# Patient Record
Sex: MALE | Race: WHITE | Employment: OTHER | URBAN - METROPOLITAN AREA
[De-identification: names, ages, dates, MRNs, and addresses within clinical notes are randomized per-mention and may not be internally consistent; named-entity substitution may affect disease eponyms.]

---

## 2019-04-08 ENCOUNTER — APPOINTMENT (OUTPATIENT)
Dept: CT IMAGING | Age: 69
End: 2019-04-08
Payer: OTHER GOVERNMENT

## 2019-04-08 ENCOUNTER — HOSPITAL ENCOUNTER (EMERGENCY)
Age: 69
Discharge: ANOTHER HOSPITAL | End: 2019-04-09
Attending: EMERGENCY MEDICINE
Payer: OTHER GOVERNMENT

## 2019-04-08 ENCOUNTER — APPOINTMENT (OUTPATIENT)
Dept: GENERAL RADIOLOGY | Age: 69
End: 2019-04-08
Payer: OTHER GOVERNMENT

## 2019-04-08 DIAGNOSIS — N18.9 CHRONIC KIDNEY DISEASE, UNSPECIFIED CKD STAGE: ICD-10-CM

## 2019-04-08 DIAGNOSIS — S02.2XXA CLOSED FRACTURE OF NASAL BONE, INITIAL ENCOUNTER: ICD-10-CM

## 2019-04-08 DIAGNOSIS — S06.5XAA SDH (SUBDURAL HEMATOMA): Primary | ICD-10-CM
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
## 2019-04-08 DIAGNOSIS — S01.81XA FACIAL LACERATION, INITIAL ENCOUNTER: ICD-10-CM

## 2019-04-08 LAB
ANION GAP SERPL CALCULATED.3IONS-SCNC: 14 MMOL/L (ref 3–16)
APTT: 26 SEC (ref 26–36)
BASOPHILS ABSOLUTE: 0 K/UL (ref 0–0.2)
BASOPHILS RELATIVE PERCENT: 0.3 %
BILIRUBIN URINE: NEGATIVE
BLOOD, URINE: ABNORMAL
BUN BLDV-MCNC: 43 MG/DL (ref 7–20)
CALCIUM SERPL-MCNC: 8.5 MG/DL (ref 8.3–10.6)
CHLORIDE BLD-SCNC: 99 MMOL/L (ref 99–110)
CLARITY: CLEAR
CO2: 22 MMOL/L (ref 21–32)
COLOR: YELLOW
CREAT SERPL-MCNC: 1.8 MG/DL (ref 0.8–1.3)
EOSINOPHILS ABSOLUTE: 0.1 K/UL (ref 0–0.6)
EOSINOPHILS RELATIVE PERCENT: 1.2 %
EPITHELIAL CELLS, UA: 0 /HPF (ref 0–5)
ETHANOL: NORMAL MG/DL (ref 0–0.08)
GFR AFRICAN AMERICAN: 45
GFR NON-AFRICAN AMERICAN: 38
GLUCOSE BLD-MCNC: 208 MG/DL (ref 70–99)
GLUCOSE URINE: NEGATIVE MG/DL
HCT VFR BLD CALC: 30.8 % (ref 40.5–52.5)
HEMOGLOBIN: 10.5 G/DL (ref 13.5–17.5)
HYALINE CASTS: 0 /LPF (ref 0–8)
INR BLD: 1.04 (ref 0.86–1.14)
KETONES, URINE: NEGATIVE MG/DL
LEUKOCYTE ESTERASE, URINE: NEGATIVE
LYMPHOCYTES ABSOLUTE: 1.1 K/UL (ref 1–5.1)
LYMPHOCYTES RELATIVE PERCENT: 16.7 %
MCH RBC QN AUTO: 30.7 PG (ref 26–34)
MCHC RBC AUTO-ENTMCNC: 34.1 G/DL (ref 31–36)
MCV RBC AUTO: 89.8 FL (ref 80–100)
MICROSCOPIC EXAMINATION: YES
MONOCYTES ABSOLUTE: 0.5 K/UL (ref 0–1.3)
MONOCYTES RELATIVE PERCENT: 7.9 %
NEUTROPHILS ABSOLUTE: 4.7 K/UL (ref 1.7–7.7)
NEUTROPHILS RELATIVE PERCENT: 73.9 %
NITRITE, URINE: NEGATIVE
PDW BLD-RTO: 14 % (ref 12.4–15.4)
PH UA: 5.5 (ref 5–8)
PLATELET # BLD: 161 K/UL (ref 135–450)
PMV BLD AUTO: 7 FL (ref 5–10.5)
POTASSIUM SERPL-SCNC: 4 MMOL/L (ref 3.5–5.1)
PROTEIN UA: NEGATIVE MG/DL
PROTHROMBIN TIME: 11.8 SEC (ref 9.8–13)
RBC # BLD: 3.42 M/UL (ref 4.2–5.9)
RBC UA: 1 /HPF (ref 0–4)
SODIUM BLD-SCNC: 135 MMOL/L (ref 136–145)
SPECIFIC GRAVITY UA: 1.01 (ref 1–1.03)
TROPONIN: <0.01 NG/ML
URINE REFLEX TO CULTURE: ABNORMAL
URINE TYPE: ABNORMAL
UROBILINOGEN, URINE: 0.2 E.U./DL
WBC # BLD: 6.4 K/UL (ref 4–11)
WBC UA: 1 /HPF (ref 0–5)

## 2019-04-08 PROCEDURE — 85730 THROMBOPLASTIN TIME PARTIAL: CPT

## 2019-04-08 PROCEDURE — 81001 URINALYSIS AUTO W/SCOPE: CPT

## 2019-04-08 PROCEDURE — 72125 CT NECK SPINE W/O DYE: CPT

## 2019-04-08 PROCEDURE — 85025 COMPLETE CBC W/AUTO DIFF WBC: CPT

## 2019-04-08 PROCEDURE — G0480 DRUG TEST DEF 1-7 CLASSES: HCPCS

## 2019-04-08 PROCEDURE — 80048 BASIC METABOLIC PNL TOTAL CA: CPT

## 2019-04-08 PROCEDURE — 70486 CT MAXILLOFACIAL W/O DYE: CPT

## 2019-04-08 PROCEDURE — 84484 ASSAY OF TROPONIN QUANT: CPT

## 2019-04-08 PROCEDURE — 99285 EMERGENCY DEPT VISIT HI MDM: CPT

## 2019-04-08 PROCEDURE — 70450 CT HEAD/BRAIN W/O DYE: CPT

## 2019-04-08 PROCEDURE — 71045 X-RAY EXAM CHEST 1 VIEW: CPT

## 2019-04-08 PROCEDURE — 85610 PROTHROMBIN TIME: CPT

## 2019-04-08 PROCEDURE — 93005 ELECTROCARDIOGRAM TRACING: CPT | Performed by: EMERGENCY MEDICINE

## 2019-04-08 RX ORDER — CLOPIDOGREL BISULFATE 75 MG/1
75 TABLET ORAL DAILY
Status: ON HOLD | COMMUNITY
End: 2019-04-12 | Stop reason: HOSPADM

## 2019-04-08 RX ORDER — ARIPIPRAZOLE 20 MG/1
20 TABLET ORAL DAILY
Status: ON HOLD | COMMUNITY
End: 2019-04-12 | Stop reason: SDUPTHER

## 2019-04-08 SDOH — HEALTH STABILITY: MENTAL HEALTH: HOW OFTEN DO YOU HAVE A DRINK CONTAINING ALCOHOL?: NEVER

## 2019-04-08 ASSESSMENT — ENCOUNTER SYMPTOMS
NAUSEA: 0
BACK PAIN: 0
BLOOD IN STOOL: 0
COUGH: 0
ABDOMINAL PAIN: 0
VOMITING: 0
DIARRHEA: 0

## 2019-04-08 ASSESSMENT — PAIN SCALES - GENERAL: PAINLEVEL_OUTOF10: 10

## 2019-04-08 ASSESSMENT — PAIN DESCRIPTION - PAIN TYPE: TYPE: ACUTE PAIN

## 2019-04-08 ASSESSMENT — PAIN DESCRIPTION - DESCRIPTORS: DESCRIPTORS: DISCOMFORT

## 2019-04-08 ASSESSMENT — PAIN DESCRIPTION - LOCATION: LOCATION: FACE

## 2019-04-09 ENCOUNTER — HOSPITAL ENCOUNTER (INPATIENT)
Age: 69
LOS: 3 days | Discharge: NURSING, REHAB OR LONG-TERM CARE | DRG: 082 | End: 2019-04-12
Attending: FAMILY MEDICINE | Admitting: INTERNAL MEDICINE
Payer: OTHER GOVERNMENT

## 2019-04-09 ENCOUNTER — APPOINTMENT (OUTPATIENT)
Dept: CT IMAGING | Age: 69
DRG: 082 | End: 2019-04-09
Attending: FAMILY MEDICINE
Payer: OTHER GOVERNMENT

## 2019-04-09 VITALS
OXYGEN SATURATION: 96 % | SYSTOLIC BLOOD PRESSURE: 145 MMHG | RESPIRATION RATE: 16 BRPM | HEIGHT: 68 IN | HEART RATE: 84 BPM | TEMPERATURE: 98.5 F | WEIGHT: 225 LBS | DIASTOLIC BLOOD PRESSURE: 58 MMHG | BODY MASS INDEX: 34.1 KG/M2

## 2019-04-09 LAB
ALBUMIN SERPL-MCNC: 3.8 G/DL (ref 3.4–5)
ANION GAP SERPL CALCULATED.3IONS-SCNC: 13 MMOL/L (ref 3–16)
BASOPHILS ABSOLUTE: 0 K/UL (ref 0–0.2)
BASOPHILS RELATIVE PERCENT: 0.3 %
BUN BLDV-MCNC: 40 MG/DL (ref 7–20)
CALCIUM SERPL-MCNC: 8.9 MG/DL (ref 8.3–10.6)
CHLORIDE BLD-SCNC: 103 MMOL/L (ref 99–110)
CO2: 24 MMOL/L (ref 21–32)
CREAT SERPL-MCNC: 1.5 MG/DL (ref 0.8–1.3)
EKG ATRIAL RATE: 84 BPM
EKG DIAGNOSIS: NORMAL
EKG P AXIS: 14 DEGREES
EKG P-R INTERVAL: 146 MS
EKG Q-T INTERVAL: 372 MS
EKG QRS DURATION: 104 MS
EKG QTC CALCULATION (BAZETT): 439 MS
EKG R AXIS: -40 DEGREES
EKG T AXIS: 54 DEGREES
EKG VENTRICULAR RATE: 84 BPM
EOSINOPHILS ABSOLUTE: 0 K/UL (ref 0–0.6)
EOSINOPHILS RELATIVE PERCENT: 0.6 %
ESTIMATED AVERAGE GLUCOSE: 116.9 MG/DL
GFR AFRICAN AMERICAN: 56
GFR NON-AFRICAN AMERICAN: 46
GLUCOSE BLD-MCNC: 142 MG/DL (ref 70–99)
GLUCOSE BLD-MCNC: 143 MG/DL (ref 70–99)
GLUCOSE BLD-MCNC: 192 MG/DL (ref 70–99)
GLUCOSE BLD-MCNC: 222 MG/DL (ref 70–99)
HBA1C MFR BLD: 5.7 %
HCT VFR BLD CALC: 31.8 % (ref 40.5–52.5)
HEMOGLOBIN: 10.9 G/DL (ref 13.5–17.5)
LYMPHOCYTES ABSOLUTE: 0.9 K/UL (ref 1–5.1)
LYMPHOCYTES RELATIVE PERCENT: 12.9 %
MCH RBC QN AUTO: 31.2 PG (ref 26–34)
MCHC RBC AUTO-ENTMCNC: 34.4 G/DL (ref 31–36)
MCV RBC AUTO: 90.7 FL (ref 80–100)
MONOCYTES ABSOLUTE: 0.6 K/UL (ref 0–1.3)
MONOCYTES RELATIVE PERCENT: 8.6 %
NEUTROPHILS ABSOLUTE: 5.5 K/UL (ref 1.7–7.7)
NEUTROPHILS RELATIVE PERCENT: 77.6 %
PDW BLD-RTO: 13.9 % (ref 12.4–15.4)
PERFORMED ON: ABNORMAL
PHOSPHORUS: 3.7 MG/DL (ref 2.5–4.9)
PLATELET # BLD: 156 K/UL (ref 135–450)
PMV BLD AUTO: 6.7 FL (ref 5–10.5)
POTASSIUM SERPL-SCNC: 4.5 MMOL/L (ref 3.5–5.1)
RBC # BLD: 3.51 M/UL (ref 4.2–5.9)
SODIUM BLD-SCNC: 140 MMOL/L (ref 136–145)
WBC # BLD: 7.1 K/UL (ref 4–11)

## 2019-04-09 PROCEDURE — 92526 ORAL FUNCTION THERAPY: CPT

## 2019-04-09 PROCEDURE — 2580000003 HC RX 258: Performed by: STUDENT IN AN ORGANIZED HEALTH CARE EDUCATION/TRAINING PROGRAM

## 2019-04-09 PROCEDURE — 97166 OT EVAL MOD COMPLEX 45 MIN: CPT

## 2019-04-09 PROCEDURE — 1200000000 HC SEMI PRIVATE

## 2019-04-09 PROCEDURE — 70450 CT HEAD/BRAIN W/O DYE: CPT

## 2019-04-09 PROCEDURE — 97162 PT EVAL MOD COMPLEX 30 MIN: CPT

## 2019-04-09 PROCEDURE — 2500000003 HC RX 250 WO HCPCS: Performed by: STUDENT IN AN ORGANIZED HEALTH CARE EDUCATION/TRAINING PROGRAM

## 2019-04-09 PROCEDURE — 83036 HEMOGLOBIN GLYCOSYLATED A1C: CPT

## 2019-04-09 PROCEDURE — 80069 RENAL FUNCTION PANEL: CPT

## 2019-04-09 PROCEDURE — 6360000002 HC RX W HCPCS: Performed by: STUDENT IN AN ORGANIZED HEALTH CARE EDUCATION/TRAINING PROGRAM

## 2019-04-09 PROCEDURE — 97530 THERAPEUTIC ACTIVITIES: CPT

## 2019-04-09 PROCEDURE — 99222 1ST HOSP IP/OBS MODERATE 55: CPT | Performed by: INTERNAL MEDICINE

## 2019-04-09 PROCEDURE — 6370000000 HC RX 637 (ALT 250 FOR IP): Performed by: STUDENT IN AN ORGANIZED HEALTH CARE EDUCATION/TRAINING PROGRAM

## 2019-04-09 PROCEDURE — 2580000003 HC RX 258

## 2019-04-09 PROCEDURE — 2500000003 HC RX 250 WO HCPCS

## 2019-04-09 PROCEDURE — 93010 ELECTROCARDIOGRAM REPORT: CPT | Performed by: INTERNAL MEDICINE

## 2019-04-09 PROCEDURE — 97116 GAIT TRAINING THERAPY: CPT

## 2019-04-09 PROCEDURE — 85025 COMPLETE CBC W/AUTO DIFF WBC: CPT

## 2019-04-09 PROCEDURE — 92610 EVALUATE SWALLOWING FUNCTION: CPT

## 2019-04-09 PROCEDURE — 97535 SELF CARE MNGMENT TRAINING: CPT

## 2019-04-09 PROCEDURE — 6360000002 HC RX W HCPCS

## 2019-04-09 RX ORDER — DEXTROSE MONOHYDRATE 50 MG/ML
100 INJECTION, SOLUTION INTRAVENOUS PRN
Status: DISCONTINUED | OUTPATIENT
Start: 2019-04-09 | End: 2019-04-12 | Stop reason: HOSPADM

## 2019-04-09 RX ORDER — METOPROLOL SUCCINATE 50 MG/1
125 TABLET, EXTENDED RELEASE ORAL DAILY
COMMUNITY

## 2019-04-09 RX ORDER — SERTRALINE HYDROCHLORIDE 100 MG/1
150 TABLET, FILM COATED ORAL DAILY
COMMUNITY

## 2019-04-09 RX ORDER — RANITIDINE 150 MG/1
150 TABLET ORAL DAILY
COMMUNITY

## 2019-04-09 RX ORDER — ATORVASTATIN CALCIUM 80 MG/1
80 TABLET, FILM COATED ORAL NIGHTLY
Status: DISCONTINUED | OUTPATIENT
Start: 2019-04-09 | End: 2019-04-12 | Stop reason: HOSPADM

## 2019-04-09 RX ORDER — LABETALOL HYDROCHLORIDE 5 MG/ML
10 INJECTION, SOLUTION INTRAVENOUS EVERY 6 HOURS PRN
Status: DISCONTINUED | OUTPATIENT
Start: 2019-04-09 | End: 2019-04-12 | Stop reason: HOSPADM

## 2019-04-09 RX ORDER — TAMSULOSIN HYDROCHLORIDE 0.4 MG/1
0.4 CAPSULE ORAL DAILY
COMMUNITY

## 2019-04-09 RX ORDER — SODIUM CHLORIDE 0.9 % (FLUSH) 0.9 %
10 SYRINGE (ML) INJECTION EVERY 12 HOURS SCHEDULED
Status: DISCONTINUED | OUTPATIENT
Start: 2019-04-09 | End: 2019-04-12 | Stop reason: HOSPADM

## 2019-04-09 RX ORDER — ACETAMINOPHEN 500 MG
500-1000 TABLET ORAL EVERY 6 HOURS PRN
COMMUNITY

## 2019-04-09 RX ORDER — ATORVASTATIN CALCIUM 80 MG/1
80 TABLET, FILM COATED ORAL NIGHTLY
COMMUNITY

## 2019-04-09 RX ORDER — ONDANSETRON 2 MG/ML
4 INJECTION INTRAMUSCULAR; INTRAVENOUS EVERY 6 HOURS PRN
Status: DISCONTINUED | OUTPATIENT
Start: 2019-04-09 | End: 2019-04-12 | Stop reason: HOSPADM

## 2019-04-09 RX ORDER — BACITRACIN 500 [USP'U]/G
OINTMENT TOPICAL NIGHTLY
COMMUNITY

## 2019-04-09 RX ORDER — FUROSEMIDE 20 MG/1
20 TABLET ORAL DAILY
COMMUNITY

## 2019-04-09 RX ORDER — LISINOPRIL 40 MG/1
40 TABLET ORAL DAILY
Status: ON HOLD | COMMUNITY
End: 2019-04-12 | Stop reason: HOSPADM

## 2019-04-09 RX ORDER — LORATADINE 10 MG/1
10 TABLET ORAL DAILY
Status: ON HOLD | COMMUNITY
End: 2019-04-12 | Stop reason: HOSPADM

## 2019-04-09 RX ORDER — GLIPIZIDE 5 MG/1
5 TABLET ORAL
COMMUNITY

## 2019-04-09 RX ORDER — BACITRACIN 500 [USP'U]/G
OINTMENT OPHTHALMIC 3 TIMES DAILY
COMMUNITY

## 2019-04-09 RX ORDER — FINASTERIDE 5 MG/1
5 TABLET, FILM COATED ORAL DAILY
Status: DISCONTINUED | OUTPATIENT
Start: 2019-04-09 | End: 2019-04-12 | Stop reason: HOSPADM

## 2019-04-09 RX ORDER — NICOTINE POLACRILEX 4 MG
15 LOZENGE BUCCAL PRN
Status: DISCONTINUED | OUTPATIENT
Start: 2019-04-09 | End: 2019-04-12 | Stop reason: HOSPADM

## 2019-04-09 RX ORDER — LISINOPRIL 40 MG/1
40 TABLET ORAL DAILY
Status: DISCONTINUED | OUTPATIENT
Start: 2019-04-09 | End: 2019-04-12

## 2019-04-09 RX ORDER — FUROSEMIDE 20 MG/1
20 TABLET ORAL DAILY
Status: DISCONTINUED | OUTPATIENT
Start: 2019-04-09 | End: 2019-04-12 | Stop reason: HOSPADM

## 2019-04-09 RX ORDER — DIVALPROEX SODIUM 250 MG/1
250 TABLET, EXTENDED RELEASE ORAL NIGHTLY
Status: DISCONTINUED | OUTPATIENT
Start: 2019-04-09 | End: 2019-04-12 | Stop reason: HOSPADM

## 2019-04-09 RX ORDER — DIVALPROEX SODIUM 250 MG/1
250 TABLET, EXTENDED RELEASE ORAL NIGHTLY
COMMUNITY

## 2019-04-09 RX ORDER — TAMSULOSIN HYDROCHLORIDE 0.4 MG/1
0.4 CAPSULE ORAL DAILY
Status: DISCONTINUED | OUTPATIENT
Start: 2019-04-09 | End: 2019-04-12 | Stop reason: HOSPADM

## 2019-04-09 RX ORDER — SODIUM CHLORIDE 0.9 % (FLUSH) 0.9 %
10 SYRINGE (ML) INJECTION PRN
Status: DISCONTINUED | OUTPATIENT
Start: 2019-04-09 | End: 2019-04-12 | Stop reason: HOSPADM

## 2019-04-09 RX ORDER — FINASTERIDE 5 MG/1
5 TABLET, FILM COATED ORAL DAILY
COMMUNITY

## 2019-04-09 RX ORDER — DEXTROSE MONOHYDRATE 25 G/50ML
12.5 INJECTION, SOLUTION INTRAVENOUS PRN
Status: DISCONTINUED | OUTPATIENT
Start: 2019-04-09 | End: 2019-04-12 | Stop reason: HOSPADM

## 2019-04-09 RX ORDER — SODIUM CHLORIDE 9 MG/ML
INJECTION, SOLUTION INTRAVENOUS CONTINUOUS
Status: DISCONTINUED | OUTPATIENT
Start: 2019-04-09 | End: 2019-04-09

## 2019-04-09 RX ADMIN — Medication 10 ML: at 09:38

## 2019-04-09 RX ADMIN — SODIUM CHLORIDE: 9 INJECTION, SOLUTION INTRAVENOUS at 04:38

## 2019-04-09 RX ADMIN — METOPROLOL SUCCINATE 125 MG: 100 TABLET, EXTENDED RELEASE ORAL at 16:34

## 2019-04-09 RX ADMIN — Medication 10 ML: at 20:03

## 2019-04-09 RX ADMIN — SERTRALINE HYDROCHLORIDE 150 MG: 100 TABLET ORAL at 16:35

## 2019-04-09 RX ADMIN — INSULIN LISPRO 1 UNITS: 100 INJECTION, SOLUTION INTRAVENOUS; SUBCUTANEOUS at 20:02

## 2019-04-09 RX ADMIN — INSULIN LISPRO 2 UNITS: 100 INJECTION, SOLUTION INTRAVENOUS; SUBCUTANEOUS at 18:23

## 2019-04-09 RX ADMIN — TAMSULOSIN HYDROCHLORIDE 0.4 MG: 0.4 CAPSULE ORAL at 16:34

## 2019-04-09 RX ADMIN — FUROSEMIDE 20 MG: 20 TABLET ORAL at 16:35

## 2019-04-09 RX ADMIN — FINASTERIDE 5 MG: 5 TABLET, FILM COATED ORAL at 16:35

## 2019-04-09 RX ADMIN — LEVETIRACETAM 1000 MG: 100 INJECTION, SOLUTION INTRAVENOUS at 04:38

## 2019-04-09 RX ADMIN — LISINOPRIL 40 MG: 40 TABLET ORAL at 16:35

## 2019-04-09 RX ADMIN — DIVALPROEX SODIUM 250 MG: 250 TABLET, EXTENDED RELEASE ORAL at 20:02

## 2019-04-09 RX ADMIN — FAMOTIDINE 20 MG: 10 INJECTION, SOLUTION INTRAVENOUS at 09:38

## 2019-04-09 RX ADMIN — FAMOTIDINE 20 MG: 10 INJECTION, SOLUTION INTRAVENOUS at 20:02

## 2019-04-09 RX ADMIN — LEVETIRACETAM 500 MG: 100 INJECTION, SOLUTION INTRAVENOUS at 16:35

## 2019-04-09 RX ADMIN — ATORVASTATIN CALCIUM 80 MG: 80 TABLET, FILM COATED ORAL at 20:02

## 2019-04-09 ASSESSMENT — PAIN SCALES - GENERAL
PAINLEVEL_OUTOF10: 0

## 2019-04-09 ASSESSMENT — PAIN DESCRIPTION - PAIN TYPE: TYPE: ACUTE PAIN

## 2019-04-09 NOTE — H&P
Smoker    Smokeless tobacco: Never Used   Substance and Sexual Activity    Alcohol use: Not Currently     Frequency: Never    Drug use: Not on file    Sexual activity: Not on file   Lifestyle    Physical activity:     Days per week: Not on file     Minutes per session: Not on file    Stress: Not on file   Relationships    Social connections:     Talks on phone: Not on file     Gets together: Not on file     Attends Orthodox service: Not on file     Active member of club or organization: Not on file     Attends meetings of clubs or organizations: Not on file     Relationship status: Not on file    Intimate partner violence:     Fear of current or ex partner: Not on file     Emotionally abused: Not on file     Physically abused: Not on file     Forced sexual activity: Not on file   Other Topics Concern    Not on file   Social History Narrative    Not on file       Family Hx:   No family history on file. ROS:   All other systems reviewed and are negative. PHYSICAL EXAM:     Patient Vitals for the past 24 hrs:   Height Weight   04/09/19 0253 5' 8.11\" (1.73 m) 225 lb 1.4 oz (102.1 kg)       Wt Readings from Last 3 Encounters:   04/09/19 225 lb 1.4 oz (102.1 kg)   04/08/19 225 lb (102.1 kg)     Body mass index is 34.11 kg/m². No intake or output data in the 24 hours ending 04/09/19 0528    Physical Exam:  General Appearance:    cooperative, appears stated age, AO X 1/4 (self)   Head:    Normocephalic, facial laceration on the forehead, b/l periorbital ecchymosis   Eyes:    PERRL, conjunctiva/corneas clear, EOM's intact, colobama of L eye     Throat:   Lips, mucosa, and tongue normal;   Heart:    Abdomen:    Regular rate and rhythm, S1 and S2 normal, no murmur, rub   or gallop   Soft, NTND, no masses or organomegaly.  BS normal   Extremities:   Extremities normal, atraumatic, no cyanosis or edema, strength 5/5 throughout   Pulses:   2+ and symmetric all extremities   Skin:   Skin color, texture, turgor normal aside from facial laceration and ecchymoses   Neurologic:   CNII-XII intact. Normal strength and sensation. Reflexes normal     ED LABS:  Labs Reviewed   CBC WITH AUTO DIFFERENTIAL - Abnormal; Notable for the following components:       Result Value    RBC 3.51 (*)     Hemoglobin 10.9 (*)     Hematocrit 31.8 (*)     Lymphocytes # 0.9 (*)     All other components within normal limits    Narrative:     Performed at: [facility]  [address], [place] [address] [address]   Phone [phone]   RENAL FUNCTION PANEL - Abnormal; Notable for the following components:    Glucose 143 (*)     BUN 40 (*)     CREATININE 1.5 (*)     GFR Non- 46 (*)     GFR  56 (*)     All other components within normal limits    Narrative:     Performed at: [facility]  [address], [place] [facility] [address]   Phone [phone]   HEMOGLOBIN A1C   POCT GLUCOSE        ALL LABS SINCE ADMISSION:  CBC:   Recent Labs     04/08/19 2135 04/09/19  0431   WBC 6.4 7.1   HGB 10.5* 10.9*   HCT 30.8* 31.8*    156     No results found for: TSHFT4, TSH  No results found for: IRON, TIBC, FERRITIN, FOLATE, [id number], PTH  BMP:  Recent Labs     04/08/19 2135 04/09/19  0431   * 140   K 4.0 4.5   CL 99 103   CO2 22 24   BUN 43* 40*   CREATININE 1.8* 1.5*   GLUCOSE 208* 143*   CALCIUM 8.5 8.9   PHOS  --  3.7     LFT's:  No results for input(s): AST, ALT, ALB, BILITOT, ALKPHOS in the last 72 hours. Troponin:  Recent Labs     04/08/19 2135   Elridge Punt <0.01     BNP:  No results for input(s): BNP in the last 72 hours. Lipids:  No results found for: CHOL, HDL, LDLCALC, TRIG  No results found for: LABA1C  ABGs:  No results for input(s): PHART, [id number], PO2ART in the last 72 hours.   INR:  Lab Results   Component Value Date    INR 1.04 04/08/2019     U/A:  Recent Labs     04/08/19  2135   COLORU YELLOW   PHUR 5.5   WBCUA 1 1.2 cm in thickness. A small portion of this extra-axial hematoma has a biconvex contour. There is no evidence of an acute fracture. There is mild localized mass effect with effacement of adjacent sulci. There is no significant diffuse intracranial mass effect. 3rd and 4th ventricles are in the midline. There is generalized atrophy and there is chronic small vessel ischemic white matter disease. ORBITS: The visualized portion of the orbits demonstrate no acute abnormality. SINUSES: The visualized paranasal sinuses and mastoid air cells demonstrate no acute abnormality. SOFT TISSUES/SKULL:  There is a forehead hematoma slightly more prominent to the right of midline. No evidence of an acute fracture. Acute left extra-axial posterior temporoparietal hematoma most consistent with subdural hematoma measuring up to 1.2 cm in thickness. A portion of the hematoma has a biconvex configuration and an epidural component cannot be entirely excluded. Close follow-up with repeat CT head recommended. Critical results were called by Dr. Luisa Rebolledo MD to [place] [address] on 4/8/2019 at 22:28. Ct Facial Bones Wo Contrast    Result Date: 4/8/2019  EXAMINATION: CT OF THE FACE WITHOUT CONTRAST  4/8/2019 9:27 pm TECHNIQUE: CT of the face was performed without the administration of intravenous contrast. Multiplanar reformatted images are provided for review. Dose modulation, iterative reconstruction, and/or weight based adjustment of the mA/kV was utilized to reduce the radiation dose to as low as reasonably achievable. COMPARISON: None HISTORY: ORDERING SYSTEM PROVIDED HISTORY: fall, injury TECHNOLOGIST PROVIDED HISTORY: Ordering Physician Provided Reason for Exam: fall, injury Acuity: Acute Type of Exam: Initial Relevant Medical/Surgical History: Fall (pt brought in by [employer] from [place]. Pt c/o fall and facial injury from fall. pt sts that he is on a blood thinner.  Pt unsure if LOC but did hit head. ) FINDINGS: FACIAL BONES:  The maxilla, pterygoid plates and zygomatic arches are intact. The mandible is intact. The mandibular condyles are normally situated. There is a left nasal bone deformity/fracture of indeterminate age. (Axial image 68). ORBITS:  The globes appear intact. The extraocular muscles, optic nerve sheath complexes and lacrimal glands appear unremarkable. No retrobulbar hematoma or mass is seen. The orbital walls and rims are intact. SINUSES/MASTOIDS:  Mucosal thickening in the left frontal recess. Partial opacification of left ethmoid air cells and left nasal passage. SOFT TISSUES:  There is a forehead hematoma. Incomplete visualization of the acute left extra-axial temporoparietal hematoma. Left nasal bone deformity/fracture of indeterminate age. Clinical correlation is recommended. Forehead hematoma. Incomplete visualization of the acute left temporoparietal extra-axial hematoma. Ct Cervical Spine Wo Contrast    Result Date: 4/8/2019  EXAMINATION: CT OF THE CERVICAL SPINE WITHOUT CONTRAST 4/8/2019 9:06 pm TECHNIQUE: CT of the cervical spine was performed without the administration of intravenous contrast. Multiplanar reformatted images are provided for review. Dose modulation, iterative reconstruction, and/or weight based adjustment of the mA/kV was utilized to reduce the radiation dose to as low as reasonably achievable. COMPARISON: None. HISTORY: ORDERING SYSTEM PROVIDED HISTORY: NECK PAIN FOLLOWING TRAUMA TECHNOLOGIST PROVIDED HISTORY: Ordering Physician Provided Reason for Exam: neck pain following trauma Acuity: Acute Type of Exam: Initial Relevant Medical/Surgical History: Fall (pt brought in by [employer] from [facility]. Pt c/o fall and facial injury from fall. pt sts that he is on a blood thinner. Pt unsure if LOC but did hit head. ) FINDINGS: BONES/ALIGNMENT: There is no evidence of an acute cervical spine fracture. There is normal alignment of the cervical spine. DEGENERATIVE CHANGES: Anterior fusion at C4-C5 with anterior plate and fixation screws. Fixation screws at C5 extend into the C5-C6 disc space. There are multilevel degenerative changes particularly at the levels above and below the fusion. SOFT TISSUES: There is no prevertebral soft tissue swelling. Postsurgical and degenerative changes with no acute abnormality of the cervical spine. It should be noted that the CT head report was called to Iliana Zepeda, the PA for this patient. Xr Chest Portable    Result Date: 4/8/2019  EXAMINATION: SINGLE XRAY VIEW OF THE CHEST 4/8/2019 8:57 pm COMPARISON: None available HISTORY: ORDERING SYSTEM PROVIDED HISTORY: fall TECHNOLOGIST PROVIDED HISTORY: Reason for exam:->fall Ordering Physician Provided Reason for Exam: fall Acuity: Unknown Type of Exam: Unknown FINDINGS: The lung volumes are low. The study is also limited due to body habitus an AP technique. The heart size is within normal limits for technique. No overt edema, large effusion or lobar consolidation is identified. Negative low lung volume portable study. Assessment/Plan:   Talat Montes is a 71 y.o. male, with  PMH of CAD s/p CABG, HTN, ischemic cardiomyopathy, DM2, HLD, depression who p/w fall and admitted with SDH. Acute metabolic encephalopathy 2/2 SDH - A/O x1, but pt has baseline dementia. Pleasant. No complaints  - NSGY consulted, appreciate recs  - f/u CT head showed no new hemorrhage, mildly decreased hematoma size  - Keppra 1g loaded, giving 500 mg IV BID  - BP goal < 160  - q1h NC    QUIQUE - Baseline Cr seems to be around 1.5 on chart review. BUN/Cr on admission 43/1.8  - NS @ 100cc/h  - holding nephrotoxic meds    DM2 - Per chart review, takes glipizide 5 mg qd. BS on admission 208.   - Hypoglycemia protocol  - LDSSI  - NPO for now pending NSGY clearance    HTN - lisinopril 20, lopressor 50 bid at home  - holding home meds  - Labetalol 10 IV q4h PRN    HLD  - holding stating for NPO    CAD  - holding ASA d/t head bleed    Depression  - Holding Zoloft 100, Abilify 30 d/t NPO    FEN - NS @ 100cc/h, NPO  PPx - SCDs, Pepcid  CODE - Full Code  DISPO - ICU for further management    I will discuss the patient with MD Claude Cason M.D.   Internal Medicine, PGY-1  4/9/2019, 5:28 AM

## 2019-04-09 NOTE — PROGRESS NOTES
Physical Therapy    Facility/Department: [facility] ICU  Initial Assessment/Treatment    NAME: Zac Balderas  : 1950  MRN: [id number]    Date of Service: 2019    Discharge Recommendations:    Zac Balderas scored a 14/24 on the AM-PAC short mobility form. Current research shows that an AM-PAC score of 17 or less is typically not associated with a discharge to the patient's home setting. Based on the patients AM-PAC score and their current functional mobility deficits, it is recommended that the patient have 3-5 sessions per week of Physical Therapy at d/c to increase the patients independence. PT Equipment Recommendations  Equipment Needed: No    Assessment    Assessment: 71 y.o. male w/ PMH Dementia who presented on 2019 to [facility] ED s/p fall. Found to have SDH. Pt currently requiring Ax1-2 for all functional mobility. Pt oriented to self and \"hospital\" only. Pt is poor historian providing questionable information. Pt is below his reported baseline and will require further skilled PT to return to [facility]. Will continue to follow. Treatment Diagnosis: Decreased functional mobility and endurance  Prognosis: Good  Decision Making: Medium Complexity  Patient Education: role of PT, use of call lightm d/c planning; pt verb understanding - rec reinforcement  Barriers to Learning: cognition   REQUIRES PT FOLLOW UP: Yes  Activity Tolerance  Activity Tolerance: Patient limited by endurance; Patient limited by fatigue       Patient Diagnosis(es): There were no encounter diagnoses. has no past medical history on file. has no past surgical history on file. Restrictions  Position Activity Restriction  Other position/activity restrictions: up with assist  Vision/Hearing  Vision: Within Functional Limits  Hearing: Within functional limits     Subjective  General  Chart Reviewed: Yes  Additional Pertinent Hx: 71 y.o. male w/ PMH Dementia who presented on 2019 to [facility] ED s/p fall.   Found to have SDH. CT Head: Possible mildly decreased left subdural hematoma   Family / Caregiver Present: No  Referring Practitioner:  Matty Klein MD  Diagnosis: SDH  Follows Commands: Within Functional Limits  Subjective  Subjective: Pt found sitting up in chair upon arrival, reporting unrated R shoulder pain, agreeable to therapy. Pain Screening  Patient Currently in Pain: Denies          Orientation  Orientation  Overall Orientation Status: Impaired  Orientation Level: Oriented to person;Oriented to place; Disoriented to time;Disoriented to situation(oriented to [employer]")  Social/Functional History  Social/Functional History  Additional Comments: Pt poor historian reporting he lives with his parents, brothers and sisters. Pt states he does his own bathing/dressing and walks with 2 canes  Cognition        Objective          AROM RLE (degrees)  RLE AROM: WFL  AROM LLE (degrees)  LLE AROM : WFL  Strength RLE  Comment: Globally 4+/5   Strength LLE  Comment: Globally 4+/5           Transfers  Sit to Stand: Contact guard assistance(from recliner, from EOB, from armed chair, )  Stand to sit: Minimal Assistance; Moderate Assistance(to sit to EOB, to armed chair, to recliner)  Ambulation  Ambulation?: Yes  Ambulation 1  Surface: level tile  Device: Rolling Walker  Assistance: 2 Person assistance(min Ax2 )  Quality of Gait: slow tom, moderate stride length and VANGIE - pt fatiguing after ~30 sec to 1 min with knees beginning to buckle and pt needing to sit. Distance: 15'+5'+10'   Stairs/Curb  Stairs?: No     Balance  Posture: Fair  Sitting - Static: Good  Sitting - Dynamic: Good  Standing - Static: Fair;Poor(min A x2 and RW)  Standing - Dynamic: Fair;Poor(min Ax2 and RW)  Comments: Pt standing at sink ~ 3 min washing face then needing to sit 2/2 to BLE buckling and reports of legs feeling tired      PT evaluation and treatment initiated. Treatment included gait and transfer training as well as patient education.     Plan

## 2019-04-09 NOTE — PROGRESS NOTES
Speech Language Pathology  Facility/Department: [facility] ICU   BEDSIDE SWALLOW EVALUATION/treatment     NAME: Zac Balderas  : 1950  MRN: [id number]    ADMISSION DATE: 2019  ADMITTING DIAGNOSIS: has Subdural hematoma ([address] on their problem list.  ONSET DATE: 19    Recent Chest Xray 19  FINDINGS:   The lung volumes are low.  The study is also limited due to body habitus an   AP technique.  The heart size is within normal limits for technique.  No   overt edema, large effusion or lobar consolidation is identified. CT of head 19  Acute left extra-axial posterior temporoparietal hematoma most consistent   with subdural hematoma measuring up to 1.2 cm in thickness.  A portion of the   hematoma has a biconvex configuration and an epidural component cannot be   entirely excluded.  Close follow-up with repeat CT head recommended. Date of Eval: 2019  Evaluating Therapist: Michelle Armenta    Current Diet level:  Current Diet : NPO  Current Liquid Diet : NPO      Primary Complaint  Patient Complaint: pt is without complaints     Pain:  Pain Assessment  Patient Currently in Pain: Denies  Pain Assessment: 0-10  Pain Level: 0  Pain Type: Acute pain  RASS Score (Ventilated): Alert and calm    Reason for Referral  Zac Balderas was referred for a bedside swallow evaluation to assess the efficiency of his swallow function, identify signs and symptoms of aspiration and make recommendations regarding safe dietary consistencies, effective compensatory strategies, and safe eating environment. Impression  Pt alert, sitting up in chair, following commands and answering questions appropriately. Oral- ROM WFL, no difficulty with mastication or anterior spillage. Pt noted to take very large bites of applesauce. Pharyngeal- with first two trials with water by straw and cup, pt noted to have delayed cough/ throat clear and belch.  Pt endorsed that it didn't feel as if it \"went down the right way\". Pt analyzed with water by cup and straw through out the session (6 oz total) and again 20 minutes later (SLP had to leave for MBS), but no s/s aspiration observed. Pt demonstrated no s/s aspiration with pureed or cracker. Vocal quality remained clear through out the assessment. Dysphagia Diagnosis: Mild pharyngeal stage dysphagia  Dysphagia Outcome Severity Scale: Level 5: Mild dysphagia- Distant supervision. May need one diet consistency restricted     Treatment Plan  Requires SLP Intervention: Yes  Duration/Frequency of Treatment: 1-3x  D/C Recommendations: To be determined       Recommended Diet and Intervention  Diet Solids Recommendation: Regular  Liquid Consistency Recommendation: Thin-Make NPO if s/s aspiration emerge and alert SLP    Recommended Form of Meds: PO  Therapeutic Interventions: Diet tolerance monitoring;Patient/Family education    Compensatory Swallowing Strategies  Small bites/sips;(pt tends to take large bites)  Upright as possible for all oral intake;  Remain upright for 30-45 minutes after meals    Treatment/Goals  1- The patient will tolerate recommended diet without observed clinical signs of aspiration    2- The pt/family will demonstrate understanding of swallowing recommendations and concerns. 4/9-The pt and brother were educated to purpose of the visit, anatomy and physiology of the swallow, concerns for aspiration, swallowing strategies, diet recommendations and possibility of being made NPO if s/s aspiration emerge. Both stated comprehension, but the pt would benefit from reinforcement. con't goal      General  Chart Reviewed: Yes  Behavior/Cognition: Alert; Cooperative;Pleasant mood  Respiratory Status: Room air  Communication Observation: Functional  Follows Directions: Simple  Dentition: Some missing teeth  Patient Positioning: Upright in chair  Baseline Vocal Quality: Normal  Volitional Cough: Strong  Prior Dysphagia History: pt denies history of dysphagia Consistencies Administered: Soft solid; Thin - straw; Thin - cup; Ice Chips;Puree           Vision/Hearing  Vision  Vision: Within Functional Limits  Hearing  Hearing: Within functional limits    Oral Motor Deficits  Oral/Motor  Oral Motor: Within functional limits    Oral Phase Dysfunction  Oral Phase  Oral Phase: WNL    ROM WFL, no difficulty with mastication or anterior spillage. Pt noted to take very large bites of applesauce. -     Indicators of Pharyngeal Phase Dysfunction      with first two trials with water by straw and cup, pt noted to have delayed cough/ throat clear and belch. Pt endorsed that it didn't feel as if it \"went down the right way\". Pt analyzed with water by cup and straw through out the session (6 oz total) and again 20 minutes later (SLP had to leave for MBS), but no s/s aspiration observed. Pt demonstrated no s/s aspiration with pureed or cracker. Vocal quality remained clear through out the assessment. Prognosis  Prognosis  Prognosis for safe diet advancement: excellent  Individuals consulted  Consulted and agree with results and recommendations: Patient; Family member  Family member consulted: brother    Education  Patient Education: Role of SLP  Patient Education Response: Verbalizes understanding         Therapy Time  SLP Individual Minutes  Time In: 200  Time Out: 1300  Minutes: 27          Pt's goal:pt eat      Plan:  Continue goals per POC  Recommended diet:regular with thin liquids-Make NPO if s/s aspiration emerge and alert SLP    Total treatment time:15dx, 15tx  Pt's discharge plan:to go home with brother   Discharge Plan: To be determined closer to discharge  Discussed with RN, Justine   Needs within reach.        Olimpia Richey [facility] [place]- [address] [facility]  Pg # [phone]  This document will serve as a discharge summary if pt discharge before next treatment   session

## 2019-04-09 NOTE — PROGRESS NOTES
HOSPITAL MEDICINE  - PROGRESS NOTE    Admit Date: 4/9/2019         Interval History: 79yom of [place] origin with baseline dementia  -admitted after a fall  at a local grocery store  -occurred in the bathroom after he got out of his electric chair and tried to walk with his cane,and fell. Patient does not remember fall      Sustained facial bruises as well as SDH    Up in chair,disoriented,cant tell me who is President or year. Afebrile. Diet: Diet NPO Effective Now      Data:   Scheduled Meds: Reviewed  Continuous Infusions:   sodium chloride 100 mL/hr at 04/09/19 0438    dextrose         Intake/Output Summary (Last 24 hours) at 4/9/2019 1235  Last data filed at 4/9/2019 0659  Gross per 24 hour   Intake 350 ml   Output 700 ml   Net -350 ml     CBC:   Recent Labs     04/09/19  0431   WBC 7.1   HGB 10.9*        BMP:  Recent Labs     04/09/19  0431      K 4.5      CO2 24   BUN 40*   CREATININE 1.5*   GLUCOSE 143*     ABGs: No results found for: PHART, PO2ART, [id number]  INR:   Recent Labs     04/08/19  2135   INR 1.04     --------------------------------------------------------    Objective:   Vitals: BP (!) 121/58   Pulse 71   Temp 98.4 °F (36.9 °C) (Oral)   Resp 20   Ht 5' 8.11\" (1.73 m)   Wt 225 lb 1.4 oz (102.1 kg)   SpO2 99%   BMI 34.11 kg/m²   General appearance: alert, appears stated age and cooperative  Skin: Skin color, texture, turgor normal.   HEENT.periorbital bruises  Neck: supple  Lungs: clear to auscultation bilaterally  Heart: RRR  Abdomen: soft, non-tender; bowel sounds normal; no masses,  no organomegaly  Extremities: no edema  Neurologic: Mental status: Alert, oriented, thought content appropriate      Assessment & Plan:       Acute metabolic encephalopathy 2/2 SDH following fall.  -has dementia baseline. Will avoid delirium provoking factors/meds as at very high risk.   Ok to transfer out of ICU       SDH  Repeat CT head with mildly decreased hematoma size  -Neurochecks,Michelle prophnitin,PT/OT       QUIQUE - resolved and Cr at Baseline  of  1.5      DM2  contr  -SSI.       HTN -benign  -  Resume home meds for goal BP less than 160 once swallow eval passed        CAD  ASA /plavix held till 2 weeks /ok by Latonia Garsia     Depression  -  Resume meds Zoloft / Abilify 30        dvt prophy    Stacey Duron MD

## 2019-04-09 NOTE — PROGRESS NOTES
Pt admitted to ICU 4523. VSS on RA. CHG bath and initial assessment performed. Pt oriented to room and unit. Will con't to monitor closely.  Bernadine Hardin RN

## 2019-04-09 NOTE — CARE COORDINATION
Case Management Admission Assessment     2019  [facility])  Clinical Case Management Department    Patient: Christine Castro  MRN: [id number] / : 1950  ACCT: [de-identified]        Admission Documentation  Attending Provider: Erik Pyle MD  Admit date/time: 2019  2:40 AM  Status: Inpatient [101]  Diagnosis: Subdural hematoma ([address]     Readmission within last 30 days:  no     Admitted to ICU after a fall resulting in a subdural hematoma. Scored 14/24 on AM-PAC for PT. Scored 15/24 on AM-PAC for OT. Met with patient and brother at bedside. Willing to consider a SNF for rehab. Provided a Medicare SNF list.  Will check back tomorrow on choices and send referrals. PTA Living Situation  Discharge Planning  Living Arrangements: Family Members  Support Systems: Family Members  Potential Assistance Needed: Skilled Nursing Facility  Type of Home Care Services: None  Patient expects to be discharged to[de-identified] home  Expected Discharge Date: 19    Service Assessment       Values / Beliefs  Do you have any ethnic, cultural, sacramental, or spiritual Moravian needs you would like us to be aware of while you are in the hospital?: No    Advance Directives (For Healthcare)  Pre-existing DNR Comfort Care/DNR Arrest/DNI Order: No  Healthcare Directive: No, patient does not have an advance directive for healthcare treatment              [address]/Skilled Nursing   Home care at home? No          Pharmacy:  Potential Assistance Purchasing Medications:  No  Does patient want to participate in local refill/meds to beds program?: No    Discharge Plan   Patient expects to be discharged to[de-identified] home         Barriers to discharge: IVF    Role of Case Management discussed with patient/family/designee.      Fay Menjivar, RN, BSN  [facility]  Case Management Department  Ph: [phone]

## 2019-04-09 NOTE — CONSULTS
NEUROSURGERY CONSULT NOTE    Lea Keys  [id number]   1950   4/9/2019    Requesting physician: Iwona Parham MD    Reason for consultation: SDH    History of present illness: Patient is a 71 y.o. male w/ PMH Dementia who presented on 4/9/2019 to [facility] ED s/p fall. According to the ED physician's notes, The patient was with his brother at a local grocery store when he fell. The brother did not witness the fall. States he sent the patient to the bathroom in his electric chair and for some reason he got out of the chair, tried to walk and fell. Patient does not remember fall, but does have some baseline dementia. Brother states that he is acting normally at this time. Brother is primary caregiver and the patient lives with the brother. There have been no fevers, appetite change or any symptom change noted at home by the brother. Patient denies any pain. Denies visual changes, neck pain, back pain, difficulty moving his extremities, chest pain, short of breath, abdominal pain or any symptoms. Patient is alert to person and place but not time. ROS:   JASE 2/2 baseline dementia and language barrier, but per ED notes the brother answered the following:  Constitutional: Negative for chills and fever. Eyes: Negative for visual disturbance. Respiratory: Negative for cough. Cardiovascular: Negative for chest pain. Gastrointestinal: Negative for abdominal pain, blood in stool, diarrhea, nausea and vomiting. Genitourinary: Negative for dysuria and hematuria. Musculoskeletal: Negative for back pain, myalgias and neck pain. Skin: Positive for wound. Neurological: Negative for numbness. Allergies   Allergen Reactions    Fluvastatin      UNKNOWN REACTION,  [facility] 01/06/18       No past medical history on file. No past surgical history on file.     Social History     Occupational History    Not on file   Tobacco Use    Smoking status: Never Smoker    commands  General: Well developed. Alert and cooperative in no acute distress. HENT: atraumatic, neck supple  Eyes: Optic discs: Not tested  Pulmonary: unlabored respiratory effort  Cardiovascular:  Warm well perfused. No peripheral edema  Gastrointestinal: abdomen soft, NT, ND    Neurological:  Mental Status: Awake, alert, oriented to self, but limited on orientation exam 2/2 baseline dementia and language barrier  Attention: Intact  Language: Exam limited for aphasia 2/2 baseline dementia and language barrier  Sensation: Intact to all extremities to light touch  Coordination: Intact    Cranial Nerves:  Cranial Nerves:  II: Visual acuity not tested, denies new visual changes / diplopia  III, IV, VI: PERRL, 3 mm bilaterally, EOMI, no nystagmus noted  V: Facial sensation intact bilaterally to touch  VII: Face symmetric  VIII: Hearing intact bilaterally to spoken voice  IX: Palate movement equal bilaterally  XI: Shoulder shrug equal bilaterally  XII: Tongue midline    Musculoskeletal:   Gait: Not tested   Assist devices: None   Tone: Normal  Motor strength:    Right  Left    Right  Left    Deltoid  5 5  Hip Flex  5 5   Biceps  5 5  Knee Extensors  5 5   Triceps  5 5  Knee Flexors  5 5   Wrist Ext  5 5  Ankle Dorsiflex. 5 5   Wrist Flex  5 5  Ankle Plantarflex. 5 5   Handgrip  5 5  Ext García Longus  5 5   Thumb Ext  5 5         Radiological Findings:  Ct Facial Bones Wo Contrast  Result Date: 4/8/2019  Left nasal bone deformity/fracture of indeterminate age. Clinical correlation is recommended. Forehead hematoma. Incomplete visualization of the acute left temporoparietal extra-axial hematoma. Ct Cervical Spine Wo Contrast  Result Date: 4/8/2019  Postsurgical and degenerative changes with no acute abnormality of the cervical spine. It should be noted that the CT head report was called to Alix Santamaria for this patient.      Ct Head Wo Contrast  Result Date: 4/8/2019  Acute left extra-axial posterior temporoparietal hematoma most consistent with subdural hematoma measuring up to 1.2 cm in thickness. A portion of the hematoma has a biconvex configuration and an epidural component cannot be entirely excluded. Close follow-up with repeat CT head recommended. Ct Head Wo Contrast  Result Date: 4/9/2019  Possible mildly decreased left subdural hematoma (10 mm), versus redistribution. No new hemorrhage. No other significant interval change, please refer to prior report for full details. Labs:  Recent Labs     04/09/19  0431   WBC 7.1   HGB 10.9*   HCT 31.8*          Recent Labs     04/09/19  0431      K 4.5      CO2 24   BUN 40*   CREATININE 1.5*   GLUCOSE 143*   CALCIUM 8.9   PHOS 3.7       Recent Labs     04/08/19 2135   PROTIME 11.8   INR 1.04   APTT 26.0       Patient Active Problem List    Diagnosis Date Noted    Subdural hematoma ([facility] [address] 04/08/2019       Assessment:  Patient is a 71 y.o. male w/mildly decreased left subdural hematoma    Plan:  1. No emergent neurosurgical intervention indicated  2. Neurologic exams frequency:  - Floor: Q4H  3. For change in exam MUST contact neurosurgery team along with critical care or primary team  4. SDH:  - Follow up head CT stable. No further imaging unless there is a decline in neurologic  - Maintain SBP <160; If PRN med insufficient, then may start Nicardipine infusion  - Keep Plt >100k & INR <1.4  - Hold all full dose anticoagulation & antiplatelet for 2 weeks  5. Cerebral edema prophylaxis:  - Keep Na within normal limits  - HOB >30 degrees  - NO dextrose in IVF's or in IV drips  - If central venous access is needed please use subclavian vs femoral - No IJ as this can decrease venous return and worsen cerebral edema  6. SCDs for DVT prophylaxis; May start chemoprophylaxis 4/10/2019  7. Seizure prophylaxis: Keppra 500mg BID x7 days  8. GI Prophylaxis: Pepcid  9. Pain: Managed by medical team  10.  PT/OT consulted, appreciate recs  11. Advance diet / activity per primary team  12. Appreciate critical care team assistance in management  13. Thank you for consult. Will follow inpatient. Please call with any questions or decline in neurological status    DISPO: OK to transfer to the floor from neurosurgery standpoint. Dispo timing to be determined by primary team once patient is medically stable for discharge. Patient was seen and examined with Dr. Crystal Bray who agrees with above assessment and plan.      Electronically signed by: RALF Abraham, 4/9/2019 9:24 AM  [phone]

## 2019-04-09 NOTE — PROGRESS NOTES
Occupational Therapy/Physical Therapy  HOLD  Therapy evaluation orders received. Pt is currently on strict bedrest and awaiting neurosurgery consult. Will follow up later today vs 4/10.     Minor Arizmendi OT [id number]    [facility] [address] [facility], T [address]57190

## 2019-04-09 NOTE — PROGRESS NOTES
[facility] Attestation of Resident Documentation    I have personally seen and evaluated this patient and discussed evaluation and management with the resident, Dr. Aylin Zaabla , on 4/9/2019. My pertinent findings and plan are elaborated below. For full details see resident note. SDH secondary to fall while on Plavix/ASA  Dementia  Diabetes mellitus    Neuro checks in ICU, monitor blood pressures closely. No ASA/Plavix or anticoagulation. Load Keppra. Consult Neurosurgery. Repeat CT head at 6 hour interval.  Very demented and poor historian at baseline. PT, OT consults.   Monitor glucose, give SSI     Issa Arellano MD  4/9/2019 5:10 AM

## 2019-04-09 NOTE — ED NOTES
Pt a&o x4. Pt c/o face, neck, head, and shoulder pain after a fall at [facility]. Pt unsure if LOC but did hit head and face. Pt has is on Plavix. IV established without complications, blood work obtained and sent to lab. Pt tolerated well. Pt family at bedside. Pt placed on monitors and cycling. NSR with a HR of 80s-90s. ST segment alarm enabled. Pt laying supine in bed in low position, call light in reach, side rails up x2. Pt showing no signs of distress at this time. Breathing easy and unlabored. Will continue to monitor.         Ambar Christensen RN  04/08/19 [id number]

## 2019-04-09 NOTE — CONSULTS
Clinical Pharmacy Progress Note  Pharmacy to assist with Home Medication information    Admit date: 4/9/2019     Subjective/Objective:  Patient is a 77yr old male from home admitted from [facility] after a fall, resulting in a SDH. Likely transfer out of ICU today; passed swallow evaluation today. Pharmacy has been asked by Dr. Gregor Coto to assist with obtaining home medication information. Assessment/Plan:    1. Home Medication information:    · Attempted to ask the patient about his medications, but he was unable to provide information. Thankfully, the patient's brother come into the room and provided a list from the [place], as well as the patient's pill box that the brother fills. The brother is very knowledgeable about his brother's medications. · Home Medication List in [address] [place] [address] Rd is now up to date, as per [facility] and [place] list.      2.  Renal dose adjustment:   · Currently on famotidine 20mg IVdaily. · Scr today improved from 1.8 to 1.5, which is his baseline per notes. Per renal dose adjustment policy, will change to BID dosing. Next dose due tonight. · Likely will change to PO soon. Perfect Serve sent to Dr. Gregor Coto about the updated [address] List.      Thank you, please call with questions.    Selvin Mcbride PharmD., BCPS   4/9/2019 2:06 PM  Wireless: [phone]

## 2019-04-09 NOTE — ED NOTES
Bed: 01  Expected date:   Expected time:   Means of arrival:   Comments:  Emergency only     Angel Morley RN  04/08/19 [id number]

## 2019-04-09 NOTE — ED PROVIDER NOTES
[facility]  eMERGENCY dEPARTMENT eNCOUnter        Pt Name: Krissy Lainez  MRN: [id number]  [place] 1950  Date of evaluation: 4/8/2019  Provider: Luis Whipple PA-C  [facility]P: [facility]    This patient was seen and evaluated by the attending physician Giles Patel, *. The total critical care time spent while evaluating and treating this patient was at least 40 minutes. This excludes time spent doing separately billable procedures. This includes time at the bedside, data interpretation, medication management, obtaining critical history from collateral sources if the patient is unable to provide it directly, and physician consultation. Specifics of interventions taken and potentially life-threatening diagnostic considerations are listed above in the medical decision making. CHIEF COMPLAINT       Chief Complaint   Patient presents with    Fall     pt brought in by [facility] from [facility]. Pt c/o fall and facial injury from fall. pt sts that he is on a blood thinner. Pt unsure if LOC but did hit head. HISTORY OF PRESENT ILLNESS   (Location/Symptom, Timing/Onset, Context/Setting, Quality, Duration, Modifying Factors, Severity)  Note limiting factors. Krissy Lainez is a 71 y.o. male That presents to the emergency department with his brother after a fall that happened at a local grocery store. The brother did not witness the fall. States that he sent the patient to the bathroom and his electric chair and for some reason got out of the chair and tried to walk and fell. Patient does not remember fall but does have some baseline dementia. Brother states that she is acting normally at this time. Brother's primary caregiver and the patient lives with the brother. There is been no fevers, appetite change or any symptom change noted at home by the brother.   Brother states for some reason he tried to get out of the chair and ambulate with his cane and fell and he was called overhead at the grocery store. Patient denies any pain. Denies visual changes, neck pain, back pain, difficulty moving his extremities, chest pain, short of breath, abdominal pain or any symptoms. Patient is alert to person and place but not time. Nursing Notes were all reviewed and agreed with or any disagreements were addressed  in the HPI. REVIEW OF SYSTEMS    (2-9 systems for level 4, 10 or more for level 5)     Review of Systems   Constitutional: Negative for chills and fever. Eyes: Negative for visual disturbance. Respiratory: Negative for cough. Cardiovascular: Negative for chest pain. Gastrointestinal: Negative for abdominal pain, blood in stool, diarrhea, nausea and vomiting. Genitourinary: Negative for dysuria and hematuria. Musculoskeletal: Negative for back pain, myalgias and neck pain. Skin: Positive for wound. Neurological: Negative for numbness. Positives and Pertinent negatives as per HPI. Except as noted abovein the ROS, all other systems were reviewed and negative. PAST MEDICAL HISTORY   History reviewed. No pertinent past medical history. SURGICAL HISTORY   History reviewed. No pertinent surgical history. [address]       Discharge Medication List as of 4/9/2019  2:18 AM      CONTINUE these medications which have NOT CHANGED    Details   clopidogrel (PLAVIX) 75 MG tablet Take 75 mg by mouth dailyHistorical Med      aspirin 81 MG tablet Take 81 mg by mouth dailyHistorical Med      ARIPiprazole (ABILIFY) 20 MG tablet Take 20 mg by mouth dailyHistorical Med               ALLERGIES     Fluvastatin    FAMILYHISTORY     History reviewed. No pertinent family history.        SOCIAL HISTORY       Social History     Socioeconomic History    Marital status: Single     Spouse name: None    Number of children: None    Years of education: None    Highest education level: None Performed at:  [facility]  [address], [address]   Phone [phone]   MICROSCOPIC URINALYSIS    Narrative:     Performed at:  [facility]  [address],  [place], [address]   Phone [phone]   APTT    Narrative:     Performed at:  [facility] [address] [facility]  [place], [address]   Phone [phone]   PROTIME-INR    Narrative:     Performed at:  [facility] [address] [facility],  [place], [address]   Phone [phone]       All other labs were within normal range or not returned as of this dictation. EKG: All EKG's are interpreted by the Emergency Department Physician who either signs orCo-signs this chart in the absence of a cardiologist.  Please see their note for interpretation of EKG. RADIOLOGY:   Non-plain film images such as CT, Ultrasound and MRI are read by the radiologist. Giorgi Hong radiographic images are visualized andpreliminarily interpreted by the  ED Provider with the below findings:        Interpretation [facility] Radiologist below, if available at the time of this note:    CT Cervical Spine WO Contrast   Final Result   Postsurgical and degenerative changes with no acute abnormality of the   cervical spine. It should be noted that the CT head report was called to Diane Sánchez, the   PA for this patient. CT FACIAL BONES WO CONTRAST   Final Result   Left nasal bone deformity/fracture of indeterminate age. Clinical   correlation is recommended. Forehead hematoma. Incomplete visualization of the acute left temporoparietal extra-axial   hematoma. CT Head WO Contrast   Final Result   Acute left extra-axial posterior temporoparietal hematoma most consistent   with subdural hematoma measuring up to 1.2 cm in thickness.   A portion of the   hematoma has a biconvex configuration and an epidural component cannot be entirely excluded. Close follow-up with repeat CT head recommended. Critical results were called by Dr. Alhaji Crowe. MD Kesha to [address] [place] [address] on 4/8/2019 at 22:28. XR CHEST PORTABLE   Final Result   Negative low lung volume portable study. Xr Chest Portable    Result Date: 4/8/2019  EXAMINATION: SINGLE XRAY VIEW OF THE CHEST 4/8/2019 8:57 pm COMPARISON: None available HISTORY: ORDERING SYSTEM PROVIDED HISTORY: fall TECHNOLOGIST PROVIDED HISTORY: Reason for exam:->fall Ordering Physician Provided Reason for Exam: fall Acuity: Unknown Type of Exam: Unknown FINDINGS: The lung volumes are low. The study is also limited due to body habitus an AP technique. The heart size is within normal limits for technique. No overt edema, large effusion or lobar consolidation is identified. Negative low lung volume portable study. PROCEDURES   Unless otherwise noted below, none     Procedures    CRITICAL CARE TIME   N/A    CONSULTS:  1 Dr Aki Lock - neurosurgery  2. Dr. Trudy Barrera - hospitalist at [facility] and DIFFERENTIALDIAGNOSIS/MDM:   Vitals:    Vitals:    04/08/19 2240 04/08/19 2245 04/09/19 0000 04/09/19 0100   BP: (!) 135/56  135/61 (!) 145/58   Pulse: 90 89 85 84   Resp: 14 18 16 16   Temp:       TempSrc:       SpO2: 95% 97% 95% 96%   Weight:       Height:           Patient was given thefollowing medications:  Medications - No data to display    Patient presented after an unwitnessed fall. Does have some baseline dementia and brother who is the power of  states he is at his baseline. He is grossly neurologically intact. Does have abnormal left pupil of her brother believes that this is chronic from his previous cataract surgery. Remainder of cranial nerves grossly intact.   Patient is alert to person and place and not time which mother states is normal.  Did have hematoma over the frontal scalp with 2 small well approximated facial lacerations that do not require suture repair. Attending was reported to the room by the emergency CT tech and there is some concern about possible cervical fracture but also with a subdural hematoma by attending so patient was placed in cervical collar. I received a call from Dr. Shanda Street radiologist after this had been done and there is no cervical fracture. Patient does have subdural hematoma with biconvex configuration that is concerning for possible epidural however there is no fracture seen. Patient was removed from cervical collar. Discussed this with neurosurgery accepted the patient for transfer to [facility] to neuro ICU. Do not recommend Keppra Or any medication at this time. Discussed this with Dr. Shilpa Szymanski hospitalist at [facility]. also accepted the patient. Patient will be transferred to [facility] for further workup and treatment. Patient remained at his normal baseline mental status is entire time here. Was stable at time of transfer. FINAL IMPRESSION      1. SDH (subdural hematoma) (HCC)    2. Chronic kidney disease, unspecified CKD stage    3. Facial laceration, initial encounter    4. Fall, initial encounter    5. Closed fracture of nasal bone, initial encounter          DISPOSITION/PLAN   DISPOSITION Decision To Transfer 04/08/2019 10:50:18 PM      PATIENT REFERREDTO:  No follow-up provider specified.     DISCHARGE MEDICATIONS:  Discharge Medication List as of 4/9/2019  2:18 AM          DISCONTINUED MEDICATIONS:  Discharge Medication List as of 4/9/2019  2:18 AM                 (Please note that portions ofthis note were completed with a voice recognition program.  Efforts were made to edit the dictations but occasionally words are mis-transcribed.)    Karla Bowman PA-C (electronically signed)           Karla Bowman PA-C  04/09/19 [id number]

## 2019-04-09 NOTE — PROGRESS NOTES
Famotidine 20 mg BID ordered for patient. This medication is renally eliminated. Will change to famotidine 20 mg daily per renal dose adjustment policy. Estimated Creatinine Clearance: 45 mL/min (A) (based on SCr of 1.8 mg/dL (H)). Pharmacy will continue to monitor renal function and adjust dose as necessary. Please call with any questions. Thanks!     Laz Rooney Rp

## 2019-04-09 NOTE — H&P
Admission Resident History & Physical  [facility]     PCP: [facility]    Chief Complaint: Altered Mental Status     Source of Hx: Brother    HPI: Carolyn Asif, 71 y.o. male presented with chief complain of fall and facial injury from fall. History is taken from patient's brother since the patient is a poor historian. According to his brother, patient was standing in an aisle of a grocery store when he had a fall. Patient does not remember fall but does have some baseline dementia. Someone from the grocery store called his brother. When his brother arrived, he saw the patient fallen on his back in the floor of the grocery store. In the ED, CT scan of his head was done. The result showed acute left extra-axial posterior temporoparietal hematoma most consistent with subdural hematoma measuring up to 1.2 cm in thickness. A portion of hematoma has a biconvex configuration and an epidural component cannot be excluded. PMHx:   No past medical history on file. Medications:   Prior to Admission medications    Medication Sig Start Date End Date Taking? Authorizing Provider   clopidogrel (PLAVIX) 75 MG tablet Take 75 mg by mouth daily    Historical Provider, MD   aspirin 81 MG tablet Take 81 mg by mouth daily    Historical Provider, MD   ARIPiprazole (ABILIFY) 20 MG tablet Take 20 mg by mouth daily    Historical Provider, MD       Allergies: Allergies   Allergen Reactions    Fluvastatin      UNKNOWN REACTION,  [facility] 01/06/18       PSHx:   No past surgical history on file.     Social Hx:   Social History     Socioeconomic History    Marital status: Single     Spouse name: Not on file    Number of children: Not on file    Years of education: Not on file    Highest education level: Not on file   Occupational History    Not on file   Social Needs    Financial resource strain: Not on file    Food insecurity:     Worry: Not on file     Inability: Not on file   Rosie Carter Transportation needs:     Medical: Not on file     Non-medical: Not on file   Tobacco Use    Smoking status: Never Smoker    Smokeless tobacco: Never Used   Substance and Sexual Activity    Alcohol use: Not Currently     Frequency: Never    Drug use: Not on file    Sexual activity: Not on file   Lifestyle    Physical activity:     Days per week: Not on file     Minutes per session: Not on file    Stress: Not on file   Relationships    Social connections:     Talks on phone: Not on file     Gets together: Not on file     Attends Tenriism service: Not on file     Active member of club or organization: Not on file     Attends meetings of clubs or organizations: Not on file     Relationship status: Not on file    Intimate partner violence:     Fear of current or ex partner: Not on file     Emotionally abused: Not on file     Physically abused: Not on file     Forced sexual activity: Not on file   Other Topics Concern    Not on file   Social History Narrative    Not on file       Family Hx:   No family history on file. ROS: Review of Systems -   Positive for wound in his forehead and right shoulder pain. PHYSICAL EXAM:     Patient Vitals for the past 24 hrs:   Height Weight   19 0253 5' 8.11\" (1.73 m) 225 lb 1.4 oz (102.1 kg)     Wt Readings from Last 3 Encounters:   19 225 lb 1.4 oz (102.1 kg)   19 225 lb (102.1 kg)     Body mass index is 34.11 kg/m². No intake or output data in the 24 hours ending 19 0329    TEMPERATURE:  Current - ;  Max - Temp (24hrs), Av.5 °F (36.9 °C), Min:98.5 °F (36.9 °C), Max:98.5 °F (36.9 °C)  ; Ht 5' 8.11\" (1.73 m)   Wt 225 lb 1.4 oz (102.1 kg)   BMI 34.11 kg/m²     General Appearance:    cooperative, appears stated age, AO X 1/4   Head:    Normocephalic, facial laceration on the forehead and bleeding from the forehead.    Eyes:    PERRL, conjunctiva/corneas clear, EOM's intact, both eyes      Ears:    Normal TM's and external ear canals, both ears   Nose:   Nares normal, septum midline, mucosa normal, no drainage    or sinus tenderness   Throat:   Lips, mucosa, and tongue normal;   Heart:    Regular rate and rhythm, S1 and S2 normal, no murmur, rub   or gallop   Extremities:   Extremities normal, atraumatic, no cyanosis or edema, strength 5/5   Pulses:   2+ and symmetric all extremities   Skin:   Skin color, texture, turgor normal,    Neurologic:   CNII-XII intact. Normal strength and sensation               Labs:     ED LABS:  Labs Reviewed   BASIC METABOLIC PANEL W/ REFLEX TO MG FOR LOW K   CBC WITH AUTO DIFFERENTIAL        ALL LABS SINCE ADMISSION:  CBC:   Recent Labs     04/08/19 2135   WBC 6.4   HGB 10.5*   HCT 30.8*        No results found for: TSHFT4, TSH  No results found for: IRON, TIBC, FERRITIN, FOLATE, EWEBKOLY05, PTH  BMP:  Recent Labs     04/08/19 2135   *   K 4.0   CL 99   CO2 22   BUN 43*   CREATININE 1.8*   GLUCOSE 208*   CALCIUM 8.5     LFT's:  No results for input(s): AST, ALT, ALB, BILITOT, ALKPHOS in the last 72 hours. Troponin:  Recent Labs     04/08/19 2135   Tod Siva <0.01     BNP:  No results for input(s): BNP in the last 72 hours. Lipids:  No results found for: CHOL, HDL, LDLCALC, TRIG  No results found for: LABA1C  ABGs:  No results for input(s): PHART, JYL8WHT, PO2ART in the last 72 hours.   INR:  Lab Results   Component Value Date    INR 1.04 04/08/2019     U/A:  Recent Labs     04/08/19 2135   COLORU YELLOW   PHUR 5.5   WBCUA 1   RBCUA 1   CLARITYU Clear   SPECGRAV 1.011   LEUKOCYTESUR Negative   UROBILINOGEN 0.2   BILIRUBINUR Negative   BLOODU SMALL*   GLUCOSEU Negative        Imaging:   Ct Head Wo Contrast    Result Date: 4/8/2019  EXAMINATION: CT OF THE HEAD WITHOUT CONTRAST  4/8/2019 9:06 pm TECHNIQUE: CT of the head was performed without the administration of intravenous contrast. Dose modulation, iterative reconstruction, and/or weight based adjustment of the mA/kV was utilized to reduce the radiation dose to as low as reasonably achievable. COMPARISON: None. HISTORY: ORDERING SYSTEM PROVIDED HISTORY: head injury TECHNOLOGIST PROVIDED HISTORY: Has a \"code stroke\" or \"stroke alert\" been called? ->No Ordering Physician Provided Reason for Exam: head injury Acuity: Acute Type of Exam: Initial Relevant Medical/Surgical History: Fall (pt brought in by [employer] from [place]. Pt c/o fall and facial injury from fall. pt sts that he is on a blood thinner. Pt unsure if LOC but did hit head. ) FINDINGS: BRAIN/VENTRICLES: There is an acute left posterior temporoparietal subdural hematoma measuring up to 1.2 cm in thickness. A small portion of this extra-axial hematoma has a biconvex contour. There is no evidence of an acute fracture. There is mild localized mass effect with effacement of adjacent sulci. There is no significant diffuse intracranial mass effect. 3rd and 4th ventricles are in the midline. There is generalized atrophy and there is chronic small vessel ischemic white matter disease. ORBITS: The visualized portion of the orbits demonstrate no acute abnormality. SINUSES: The visualized paranasal sinuses and mastoid air cells demonstrate no acute abnormality. SOFT TISSUES/SKULL:  There is a forehead hematoma slightly more prominent to the right of midline. No evidence of an acute fracture. Acute left extra-axial posterior temporoparietal hematoma most consistent with subdural hematoma measuring up to 1.2 cm in thickness. A portion of the hematoma has a biconvex configuration and an epidural component cannot be entirely excluded. Close follow-up with repeat CT head recommended. Critical results were called by Dr. Eden Brown. [facility] 4/8/2019 at 22:28.      Ct Facial Bones Wo Contrast    Result Date: 4/8/2019  EXAMINATION: CT OF THE FACE WITHOUT CONTRAST  4/8/2019 9:27 pm TECHNIQUE: CT of the face was performed without the administration of intravenous contrast. Multiplanar reformatted images are provided for review. Dose modulation, iterative reconstruction, and/or weight based adjustment of the mA/kV was utilized to reduce the radiation dose to as low as reasonably achievable. COMPARISON: None HISTORY: ORDERING SYSTEM PROVIDED HISTORY: fall, injury TECHNOLOGIST PROVIDED HISTORY: Ordering Physician Provided Reason for Exam: fall, injury Acuity: Acute Type of Exam: Initial Relevant Medical/Surgical History: Fall (pt brought in by [employer] from [place]. Pt c/o fall and facial injury from fall. pt sts that he is on a blood thinner. Pt unsure if LOC but did hit head. ) FINDINGS: FACIAL BONES:  The maxilla, pterygoid plates and zygomatic arches are intact. The mandible is intact. The mandibular condyles are normally situated. There is a left nasal bone deformity/fracture of indeterminate age. (Axial image 68). ORBITS:  The globes appear intact. The extraocular muscles, optic nerve sheath complexes and lacrimal glands appear unremarkable. No retrobulbar hematoma or mass is seen. The orbital walls and rims are intact. SINUSES/MASTOIDS:  Mucosal thickening in the left frontal recess. Partial opacification of left ethmoid air cells and left nasal passage. SOFT TISSUES:  There is a forehead hematoma. Incomplete visualization of the acute left extra-axial temporoparietal hematoma. Left nasal bone deformity/fracture of indeterminate age. Clinical correlation is recommended. Forehead hematoma. Incomplete visualization of the acute left temporoparietal extra-axial hematoma. Ct Cervical Spine Wo Contrast    Result Date: 4/8/2019  EXAMINATION: CT OF THE CERVICAL SPINE WITHOUT CONTRAST 4/8/2019 9:06 pm TECHNIQUE: CT of the cervical spine was performed without the administration of intravenous contrast. Multiplanar reformatted images are provided for review.  Dose modulation, iterative reconstruction, and/or weight based adjustment of the mA/kV was utilized to reduce the radiation dose to as low as reasonably achievable. COMPARISON: None. HISTORY: ORDERING SYSTEM PROVIDED HISTORY: NECK PAIN FOLLOWING TRAUMA TECHNOLOGIST PROVIDED HISTORY: Ordering Physician Provided Reason for Exam: neck pain following trauma Acuity: Acute Type of Exam: Initial Relevant Medical/Surgical History: Fall (pt brought in by [employer] from [place]. Pt c/o fall and facial injury from fall. pt sts that he is on a blood thinner. Pt unsure if LOC but did hit head. ) FINDINGS: BONES/ALIGNMENT: There is no evidence of an acute cervical spine fracture. There is normal alignment of the cervical spine. DEGENERATIVE CHANGES: Anterior fusion at C4-C5 with anterior plate and fixation screws. Fixation screws at C5 extend into the C5-C6 disc space. There are multilevel degenerative changes particularly at the levels above and below the fusion. SOFT TISSUES: There is no prevertebral soft tissue swelling. Postsurgical and degenerative changes with no acute abnormality of the cervical spine. It should be noted that the CT head report was called to Tahir Biggs, the PA for this patient. Xr Chest Portable    Result Date: 4/8/2019  EXAMINATION: SINGLE XRAY VIEW OF THE CHEST 4/8/2019 8:57 pm COMPARISON: None available HISTORY: ORDERING SYSTEM PROVIDED HISTORY: fall TECHNOLOGIST PROVIDED HISTORY: Reason for exam:->fall Ordering Physician Provided Reason for Exam: fall Acuity: Unknown Type of Exam: Unknown FINDINGS: The lung volumes are low. The study is also limited due to body habitus an AP technique. The heart size is within normal limits for technique. No overt edema, large effusion or lobar consolidation is identified. Negative low lung volume portable study. Assessment/Plan:     - AMS:  In the ED, CT scan was done. The result showed acute left extra-axial posterior temporoparietal hematoma most consistent with subdural hematoma measuring up to 1.2 cm in thickness.  A portion of hematoma has a biconvex configuration and an epidural component cannot be excluded. - Monitor the patient for AMS  - F/U CT scan    - Facial laceration:  Wound care by nursing staff. - Closed fracture of nasal bone:   CT of the face was performed without the administration of intravenous contrast. There is a left nasal bone deformity/fracture of indeterminate age.      FEN -     PPx -     CODE - Full Code    DISPO -     I will discuss the patient with MD Jeanie Pinto, MS 4  Pager: [phone]  4/9/2019, 3:29 AM

## 2019-04-09 NOTE — ED PROVIDER NOTES
[facility] Emergency [facility]      Pt Name: Lety Peralta  MRN: [id number]  [place] 1950  Date of evaluation: 4/8/2019  Provider: Jose Manuel Farah MD  I independently performed a history and physical on Lety Peralta. All diagnostic, treatment, and disposition decisions were made by myself in conjunction with the advanced practice provider. HPI: Lety Peralta presented with   Chief Complaint   Patient presents with   [facility] Fall     pt brought in by [employer] from [place]. Pt c/o fall and facial injury from fall. pt sts that he is on a blood thinner. Pt unsure if LOC but did hit head. Lety Peralta has no past medical history on file. He has no past surgical history on file. No current facility-administered medications on file prior to encounter.       Current Outpatient Medications on File Prior to Encounter   Medication Sig Dispense Refill    clopidogrel (PLAVIX) 75 MG tablet Take 75 mg by mouth daily      aspirin 81 MG tablet Take 81 mg by mouth daily      ARIPiprazole (ABILIFY) 20 MG tablet Take 20 mg by mouth daily       PHYSICAL EXAM  Vitals: BP (!) 135/56   Pulse 89   Temp 98.5 °F (36.9 °C) (Oral)   Resp 18   Ht 5' 8\" (1.727 m)   Wt 225 lb (102.1 kg)   SpO2 97%   BMI 34.21 kg/m²   Constitutional:  71 y.o. male alert, cooperative  HENT:  Facial laceration with sts, mucous membranes moist  Eyes:   Conjunctiva clear, no icterus  Neck:  Supple, no visible JVD, no signs of injury  Cardiovascular:  Regular, no rubs, no discernible murmur  Thorax & Lungs:  No accessory muscle usage, clear  Abdomen:  Soft, non distended, NT  Back:  No deformity  Genitalia:  Deferred  Rectal:  Deferred  Extremities:  No cyanosis, no edema, adequate perfusion  Skin:  Warm, dry  Neurologic:  Alert, confused, spontaneously moves extremities without overt deficit, dementia and weakness at his baseline (uses a wheelchair)  Psychiatric:  Affect appropriate    Medical Decision Making and Plan:  Briefly, this is an 71 y.o.male who presented with fall. He has an ICH, on blood thinner Plavix and aspirin. Neurosurgery consulted and will transfer for further care. For further details of [facility] Emergency Department encounter, please see documentation by advanced practice provider FLOR Moffett.      Labs Reviewed   CBC WITH AUTO DIFFERENTIAL - Abnormal; Notable for the following components:       Result Value    RBC 3.42 (*)     Hemoglobin 10.5 (*)     Hematocrit 30.8 (*)     All other components within normal limits    Narrative:     Performed at:  [facility] [address] [employer]   Phone [phone]   BASIC METABOLIC PANEL - Abnormal; Notable for the following components:    Sodium 135 (*)     Glucose 208 (*)     BUN 43 (*)     CREATININE 1.8 (*)     GFR Non- 38 (*)     GFR  45 (*)     All other components within normal limits    Narrative:     Performed at:  [facility] [address] [employer] [address], [place]   Phone [phone]   URINE RT REFLEX TO CULTURE - Abnormal; Notable for the following components:    Blood, Urine SMALL (*)     All other components within normal limits    Narrative:     Performed at:  [facility] [address], [place]   Phone [phone]   TROPONIN    Narrative:     Performed at:  [facility] [address], [place]   Phone [phone]   ETHANOL    Narrative:     Performed at:  [facility] [address], [place]   Phone [phone]   MICROSCOPIC URINALYSIS    Narrative:     Performed at:  [facility] [address], [place]   Phone [phone]   APTT    Narrative:     Performed at:  [facility]  [address],  [place], [address]   Phone [phone]   PROTIME-INR    Narrative:     Performed at:  [facility]  [address],  [place], [address]   Phone [phone]     RADIOLOGY:     Plain x-rays were viewed by me:   CT Cervical Spine WO Contrast   Final Result   Postsurgical and degenerative changes with no acute abnormality of the   cervical spine. It should be noted that the CT head report was called to Diane Sánchez, the   PA for this patient. CT FACIAL BONES WO CONTRAST   Final Result   Left nasal bone deformity/fracture of indeterminate age. Clinical   correlation is recommended. Forehead hematoma. Incomplete visualization of the acute left temporoparietal extra-axial   hematoma. CT Head WO Contrast   Final Result   Acute left extra-axial posterior temporoparietal hematoma most consistent   with subdural hematoma measuring up to 1.2 cm in thickness. A portion of the   hematoma has a biconvex configuration and an epidural component cannot be   entirely excluded. Close follow-up with repeat CT head recommended. Critical results were called by Dr. Chayito Rebolledo MD to [address] [place] [address] on 4/8/2019 at 22:28. XR CHEST PORTABLE   Final Result   Negative low lung volume portable study. EKG:  Read by me in the absence of a cardiologist shows:  Sinus rhythm, normal rate, normal conduction intervals, normal axis, no acute injury pattern, NSSTTWA, no prior EKG available     CRITICAL CARE:   Total critical care time of 31 minutes (excludes any time for procedures). This includes but not limited to vital sign monitoring, medication, clinical response to medications, interpretation of diagnostics, review of nursing notes, pertinent record review, discussions about patient condition, consultation time, documentation time. Critical care due to the patient's ICH.     Vitals:    04/08/19 2202 04/08/19 2240 04/08/19 2245 04/09/19 0000   BP:  (!) 135/56  135/61   Pulse: 88 90 89 85   Resp: 15 14 18 16   Temp:       TempSrc:       SpO2: 94% 95% 97% 95%   Weight:       Height:         FINAL IMPRESSION:    1. SDH (subdural hematoma) (HCC)    2. Chronic kidney disease, unspecified CKD stage    3. Facial laceration, initial encounter    4. Fall, initial encounter    5.  Closed fracture of nasal bone, initial encounter         Lina Krabbe, MD  04/09/19 6663
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
## 2019-04-09 NOTE — ED NOTES
Assumed care of pt at this time  PA at bedside, no cervical fx, c-collar removed by FLOR Garcia, RN  04/08/19 6158

## 2019-04-09 NOTE — CONSULTS
Pulmonary & Critical Care Medicine ICU Consultation    CHIEF COMPLAINT / HPI:                The patient is a 71 y.o. male with significant past medical history of CAD s/p CABG, ischemic cardiomyopathy, DMII, HTN, HLD presented with fall out of electric wheelchair at [facility]. History per chart review, pt does not recall what happened. Pt has baseline dementia, unclear baseline. CT head in ED showed L SDH, stable on repeat scan. No neurologic deficits on exam. Pt's English is poor, seems to understand questions but responds in [place]. This AM, pt denies any complaint, A&Ox1 (self). A little sleepy but easily arousable. No neurologic deficits on limited exam.     Past Medical History:  No past medical history on file. Past Surgical History:    No past surgical history on file. Social History:    TOBACCO:   reports that he has never smoked. He has never used smokeless tobacco.  ETOH:   reports that he drank alcohol. Family History:   No family history on file. REVIEW OF SYSTEMS:    Review of Systems   Unable to perform ROS: Dementia        Objective:   PHYSICAL EXAM:      VITALS:  BP (!) 155/72   Pulse 71   Temp 98.4 °F (36.9 °C) (Oral)   Resp 20   Ht 5' 8.11\" (1.73 m)   Wt 225 lb 1.4 oz (102.1 kg)   SpO2 99%   BMI 34.11 kg/m²       24HR INTAKE/OUTPUT:      Intake/Output Summary (Last 24 hours) at 2019 1109  Last data filed at 2019 0659  Gross per 24 hour   Intake 350 ml   Output 700 ml   Net -350 ml     CURRENT PULSE OXIMETRY:  SpO2: 99 %  24HR PULSE OXIMETRY RANGE:  SpO2  Av.2 %  Min: 94 %  Max: 100 % on RA    General appearance: Sleepy but arousable. No apparent distress, appears stated age and cooperative. HEENT: Pupils equal, round, and reactive to light. Conjunctivae/corneas clear. Bilateral suborbital ecchymosis with superficial forehead abrasions. Neck: Supple, with full range of motion. Respiratory:  Normal respiratory effort.  Clear to auscultation, bilaterally without rales/wheezes/rhonchi. Cardiovascular: Regular rate and rhythm with normal S1/S2 without murmurs, rubs or gallops. Abdomen: Soft, non-tender, non-distended. Musculoskeletal: No clubbing, cyanosis or edema bilaterally. Full range of motion without deformity. Neurologic:  Alert and oriented x1 (self only), unclear baseline. Neurovascularly intact without any focal sensory/motor deficits. Cranial nerves: II-XII intact, grossly non-focal.  Peripheral Pulses: +2 palpable, equal bilaterally       Current Medications:     sodium chloride flush  10 mL Intravenous 2 times per day    famotidine (PEPCID) injection  20 mg Intravenous Daily    levetiracetam  500 mg Intravenous Q12H    insulin lispro  0-6 Units Subcutaneous TID WC    insulin lispro  0-3 Units Subcutaneous Nightly     Allergies:    Allergies   Allergen Reactions    Fluvastatin      UNKNOWN REACTION,  [facility] 01/06/18       IV:   sodium chloride 100 mL/hr at 04/09/19 0438    dextrose         DATA:    Old records have been reviewed  CBC with Differential:    Lab Results   Component Value Date    WBC 7.1 04/09/2019    RBC 3.51 04/09/2019    HGB 10.9 04/09/2019    HCT 31.8 04/09/2019     04/09/2019    MCV 90.7 04/09/2019    MCH 31.2 04/09/2019    MCHC 34.4 04/09/2019    RDW 13.9 04/09/2019    LYMPHOPCT 12.9 04/09/2019    MONOPCT 8.6 04/09/2019    BASOPCT 0.3 04/09/2019    MONOSABS 0.6 04/09/2019    LYMPHSABS 0.9 04/09/2019    EOSABS 0.0 04/09/2019    BASOSABS 0.0 04/09/2019     BMP:    Lab Results   Component Value Date     04/09/2019    K 4.5 04/09/2019     04/09/2019    CO2 24 04/09/2019    BUN 40 04/09/2019    CREATININE 1.5 04/09/2019    CALCIUM 8.9 04/09/2019    GFRAA 56 04/09/2019    LABGLOM 46 04/09/2019    GLUCOSE 143 04/09/2019     Hepatic Function Panel:  No results found for: ALKPHOS, ALT, AST, PROT, BILITOT, BILIDIR, IBILI  ABG:  No results found for: JGV8KJH, BEART, A3TZQMZI, PHART, THGBART, WMN7DNH, PO2ART, [id number]        Radiology Review:  All pertinent images / reports were reviewed as a part of this visit. imaging reveals the following:     CT head WO contrast   Final Result        FINDINGS/IMPRESSION:        Possible mildly decreased left subdural hematoma (10 mm), versus    redistribution.     No new hemorrhage.     No other significant interval change, please refer to prior report for    full details. Assessment/Plan   Lety Peralta is a 71 y.o. male w/ CAD s/p CABG, ischemic cardiomyopathy, DMII, HTN, HLD presented with fall found to have L SDH. Subdural hematoma  Repeat CT head stable. Neuro exam stable. - NSGY on board   - hold plavix/ASA 2 weeks   - can start chemo DVT ppx on 4/10/19   - keppra 500 BID day 2/7   - neurocheck q4H  - SLP eval  - PT/OT  - labetalol 10 q6H PRN for SBP >160    QUIQUE, resolved  Cr now back at baseline 1.5.   - [email or web address]    CAD s/p CABG  - holding plavix/ASA for 2 weeks  - pharm to med rec      DMII  Home meds without any DM medication.  - pharm to med rec  - LDSSI  - hypoglycemia protocol  - accucheck qACHS  - carb cont diet    HTN  Home meds without HTN meds  - pharm to med rec  - SBP < 160 with PRN labetalol    HLD  -pharm to med rec    HFrEF  Last echo in care everywhere in 2015, not able to view results. Per cardiology note on 2/2017 - has EF 45% and was supposed to be on lasix 20 daily. - repeat echo  - pharm to med rec    Depression  - resume home abilify        Code Status: Full Code  F/E/N:   Diet NPO Effective Now   GI / DVT Prophylaxis: SCDs  Disposition:  ICU - transfer to floors today      Chago Gaviria MD  Internal Medicine, PGY 1      I will discuss the patient with the senior resident and attending, Layne Gonzales MD.     [facility] [place]    Patient seen and examined. I agree with Dr. Penelope Hollins history, physical, lab findings, assessment and plan. SDH stable. No surgical intervention needed.   Blood pressure at goal.   His chronic medical problems including CAD, CHF, HTN and DM are stable  Okay to transfer to floor    Antonio Wolff MD

## 2019-04-10 LAB
ALBUMIN SERPL-MCNC: 3.5 G/DL (ref 3.4–5)
ANION GAP SERPL CALCULATED.3IONS-SCNC: 13 MMOL/L (ref 3–16)
BASOPHILS ABSOLUTE: 0 K/UL (ref 0–0.2)
BASOPHILS RELATIVE PERCENT: 0.3 %
BUN BLDV-MCNC: 33 MG/DL (ref 7–20)
CALCIUM SERPL-MCNC: 8.6 MG/DL (ref 8.3–10.6)
CHLORIDE BLD-SCNC: 104 MMOL/L (ref 99–110)
CO2: 22 MMOL/L (ref 21–32)
CREAT SERPL-MCNC: 1.5 MG/DL (ref 0.8–1.3)
EOSINOPHILS ABSOLUTE: 0.1 K/UL (ref 0–0.6)
EOSINOPHILS RELATIVE PERCENT: 1.5 %
GFR AFRICAN AMERICAN: 56
GFR NON-AFRICAN AMERICAN: 46
GLUCOSE BLD-MCNC: 165 MG/DL (ref 70–99)
GLUCOSE BLD-MCNC: 167 MG/DL (ref 70–99)
GLUCOSE BLD-MCNC: 169 MG/DL (ref 70–99)
GLUCOSE BLD-MCNC: 180 MG/DL (ref 70–99)
GLUCOSE BLD-MCNC: 316 MG/DL (ref 70–99)
HCT VFR BLD CALC: 30.6 % (ref 40.5–52.5)
HEMOGLOBIN: 10.5 G/DL (ref 13.5–17.5)
LYMPHOCYTES ABSOLUTE: 1.1 K/UL (ref 1–5.1)
LYMPHOCYTES RELATIVE PERCENT: 19.6 %
MCH RBC QN AUTO: 31.1 PG (ref 26–34)
MCHC RBC AUTO-ENTMCNC: 34.4 G/DL (ref 31–36)
MCV RBC AUTO: 90.3 FL (ref 80–100)
MONOCYTES ABSOLUTE: 0.6 K/UL (ref 0–1.3)
MONOCYTES RELATIVE PERCENT: 10.5 %
NEUTROPHILS ABSOLUTE: 3.7 K/UL (ref 1.7–7.7)
NEUTROPHILS RELATIVE PERCENT: 68.1 %
PDW BLD-RTO: 13.8 % (ref 12.4–15.4)
PERFORMED ON: ABNORMAL
PHOSPHORUS: 3.4 MG/DL (ref 2.5–4.9)
PLATELET # BLD: 150 K/UL (ref 135–450)
PMV BLD AUTO: 7.3 FL (ref 5–10.5)
POTASSIUM SERPL-SCNC: 4 MMOL/L (ref 3.5–5.1)
RBC # BLD: 3.39 M/UL (ref 4.2–5.9)
SODIUM BLD-SCNC: 139 MMOL/L (ref 136–145)
WBC # BLD: 5.5 K/UL (ref 4–11)

## 2019-04-10 PROCEDURE — 2580000003 HC RX 258: Performed by: STUDENT IN AN ORGANIZED HEALTH CARE EDUCATION/TRAINING PROGRAM

## 2019-04-10 PROCEDURE — 97535 SELF CARE MNGMENT TRAINING: CPT

## 2019-04-10 PROCEDURE — 6360000002 HC RX W HCPCS: Performed by: STUDENT IN AN ORGANIZED HEALTH CARE EDUCATION/TRAINING PROGRAM

## 2019-04-10 PROCEDURE — 6370000000 HC RX 637 (ALT 250 FOR IP): Performed by: STUDENT IN AN ORGANIZED HEALTH CARE EDUCATION/TRAINING PROGRAM

## 2019-04-10 PROCEDURE — 97530 THERAPEUTIC ACTIVITIES: CPT

## 2019-04-10 PROCEDURE — 6370000000 HC RX 637 (ALT 250 FOR IP): Performed by: INTERNAL MEDICINE

## 2019-04-10 PROCEDURE — 80069 RENAL FUNCTION PANEL: CPT

## 2019-04-10 PROCEDURE — 97110 THERAPEUTIC EXERCISES: CPT

## 2019-04-10 PROCEDURE — 1200000000 HC SEMI PRIVATE

## 2019-04-10 PROCEDURE — 36415 COLL VENOUS BLD VENIPUNCTURE: CPT

## 2019-04-10 PROCEDURE — 2500000003 HC RX 250 WO HCPCS

## 2019-04-10 PROCEDURE — 94760 N-INVAS EAR/PLS OXIMETRY 1: CPT

## 2019-04-10 PROCEDURE — 6360000002 HC RX W HCPCS: Performed by: INTERNAL MEDICINE

## 2019-04-10 PROCEDURE — 6360000002 HC RX W HCPCS: Performed by: FAMILY MEDICINE

## 2019-04-10 PROCEDURE — 85025 COMPLETE CBC W/AUTO DIFF WBC: CPT

## 2019-04-10 PROCEDURE — 2500000003 HC RX 250 WO HCPCS: Performed by: STUDENT IN AN ORGANIZED HEALTH CARE EDUCATION/TRAINING PROGRAM

## 2019-04-10 RX ORDER — OLANZAPINE 10 MG/1
5 INJECTION, POWDER, LYOPHILIZED, FOR SOLUTION INTRAMUSCULAR
Status: ACTIVE | OUTPATIENT
Start: 2019-04-10 | End: 2019-04-10

## 2019-04-10 RX ORDER — HEPARIN SODIUM 5000 [USP'U]/ML
5000 INJECTION, SOLUTION INTRAVENOUS; SUBCUTANEOUS EVERY 8 HOURS SCHEDULED
Status: DISCONTINUED | OUTPATIENT
Start: 2019-04-10 | End: 2019-04-12 | Stop reason: HOSPADM

## 2019-04-10 RX ORDER — ACETAMINOPHEN 325 MG/1
650 TABLET ORAL EVERY 4 HOURS PRN
Status: DISCONTINUED | OUTPATIENT
Start: 2019-04-10 | End: 2019-04-12 | Stop reason: HOSPADM

## 2019-04-10 RX ORDER — HALOPERIDOL 5 MG/ML
10 INJECTION INTRAMUSCULAR ONCE
Status: COMPLETED | OUTPATIENT
Start: 2019-04-10 | End: 2019-04-10

## 2019-04-10 RX ORDER — OLANZAPINE 10 MG/1
10 INJECTION, POWDER, LYOPHILIZED, FOR SOLUTION INTRAMUSCULAR EVERY 8 HOURS PRN
Status: DISCONTINUED | OUTPATIENT
Start: 2019-04-10 | End: 2019-04-12 | Stop reason: HOSPADM

## 2019-04-10 RX ORDER — OLANZAPINE 10 MG/1
INJECTION, POWDER, LYOPHILIZED, FOR SOLUTION INTRAMUSCULAR
Status: COMPLETED
Start: 2019-04-10 | End: 2019-04-10

## 2019-04-10 RX ADMIN — FINASTERIDE 5 MG: 5 TABLET, FILM COATED ORAL at 13:59

## 2019-04-10 RX ADMIN — OLANZAPINE 10 MG: 10 INJECTION, POWDER, LYOPHILIZED, FOR SOLUTION INTRAMUSCULAR at 00:30

## 2019-04-10 RX ADMIN — ACETAMINOPHEN 650 MG: 325 TABLET ORAL at 17:00

## 2019-04-10 RX ADMIN — FUROSEMIDE 20 MG: 20 TABLET ORAL at 13:59

## 2019-04-10 RX ADMIN — HEPARIN SODIUM 5000 UNITS: 5000 INJECTION INTRAVENOUS; SUBCUTANEOUS at 17:00

## 2019-04-10 RX ADMIN — LEVETIRACETAM 500 MG: 100 INJECTION, SOLUTION INTRAVENOUS at 05:22

## 2019-04-10 RX ADMIN — INSULIN LISPRO 1 UNITS: 100 INJECTION, SOLUTION INTRAVENOUS; SUBCUTANEOUS at 09:48

## 2019-04-10 RX ADMIN — SERTRALINE HYDROCHLORIDE 150 MG: 100 TABLET ORAL at 13:59

## 2019-04-10 RX ADMIN — Medication 10 ML: at 20:07

## 2019-04-10 RX ADMIN — DIVALPROEX SODIUM 250 MG: 250 TABLET, EXTENDED RELEASE ORAL at 20:07

## 2019-04-10 RX ADMIN — Medication 10 ML: at 09:05

## 2019-04-10 RX ADMIN — LEVETIRACETAM 500 MG: 100 INJECTION, SOLUTION INTRAVENOUS at 17:01

## 2019-04-10 RX ADMIN — OLANZAPINE 10 MG: 10 INJECTION, POWDER, FOR SOLUTION INTRAMUSCULAR at 00:30

## 2019-04-10 RX ADMIN — INSULIN LISPRO 1 UNITS: 100 INJECTION, SOLUTION INTRAVENOUS; SUBCUTANEOUS at 18:57

## 2019-04-10 RX ADMIN — INSULIN LISPRO 3 UNITS: 100 INJECTION, SOLUTION INTRAVENOUS; SUBCUTANEOUS at 20:10

## 2019-04-10 RX ADMIN — HALOPERIDOL LACTATE 10 MG: 5 INJECTION INTRAMUSCULAR at 02:32

## 2019-04-10 RX ADMIN — HEPARIN SODIUM 5000 UNITS: 5000 INJECTION INTRAVENOUS; SUBCUTANEOUS at 20:11

## 2019-04-10 RX ADMIN — ATORVASTATIN CALCIUM 80 MG: 80 TABLET, FILM COATED ORAL at 20:07

## 2019-04-10 RX ADMIN — INSULIN LISPRO 1 UNITS: 100 INJECTION, SOLUTION INTRAVENOUS; SUBCUTANEOUS at 12:49

## 2019-04-10 RX ADMIN — FAMOTIDINE 20 MG: 10 INJECTION, SOLUTION INTRAVENOUS at 09:34

## 2019-04-10 RX ADMIN — FAMOTIDINE 20 MG: 10 INJECTION, SOLUTION INTRAVENOUS at 20:07

## 2019-04-10 RX ADMIN — TAMSULOSIN HYDROCHLORIDE 0.4 MG: 0.4 CAPSULE ORAL at 13:59

## 2019-04-10 ASSESSMENT — PAIN DESCRIPTION - DESCRIPTORS: DESCRIPTORS: HEADACHE

## 2019-04-10 ASSESSMENT — PAIN SCALES - PAIN ASSESSMENT IN ADVANCED DEMENTIA (PAINAD)
TOTALSCORE: 0
CONSOLABILITY: 0
FACIALEXPRESSION: 0
FACIALEXPRESSION: 0
NEGVOCALIZATION: 0
BREATHING: 0
TOTALSCORE: 0
NEGVOCALIZATION: 0
BREATHING: 0
CONSOLABILITY: 0
BODYLANGUAGE: 0
BODYLANGUAGE: 0

## 2019-04-10 ASSESSMENT — PAIN DESCRIPTION - LOCATION: LOCATION: HEAD

## 2019-04-10 ASSESSMENT — PAIN SCALES - GENERAL
PAINLEVEL_OUTOF10: 3
PAINLEVEL_OUTOF10: 0

## 2019-04-10 ASSESSMENT — PAIN - FUNCTIONAL ASSESSMENT: PAIN_FUNCTIONAL_ASSESSMENT: PREVENTS OR INTERFERES WITH MANY ACTIVE NOT PASSIVE ACTIVITIES

## 2019-04-10 ASSESSMENT — PAIN DESCRIPTION - FREQUENCY: FREQUENCY: CONTINUOUS

## 2019-04-10 ASSESSMENT — PAIN DESCRIPTION - PROGRESSION: CLINICAL_PROGRESSION: NOT CHANGED

## 2019-04-10 ASSESSMENT — PAIN DESCRIPTION - ONSET: ONSET: ON-GOING

## 2019-04-10 ASSESSMENT — PAIN DESCRIPTION - DIRECTION: RADIATING_TOWARDS: HEAD

## 2019-04-10 ASSESSMENT — PAIN DESCRIPTION - ORIENTATION: ORIENTATION: MID;ANTERIOR

## 2019-04-10 ASSESSMENT — PAIN DESCRIPTION - PAIN TYPE: TYPE: ACUTE PAIN

## 2019-04-10 NOTE — CARE COORDINATION
Spoke with Trip Wilkinson from the [facility]. They will have a bed open on Friday.   Clinical faxed to [phone] for review

## 2019-04-10 NOTE — PROGRESS NOTES
Physical Therapy  Pt is struggling with increased lethargy this afternoon. Pt could hardly stay awake to state full name or participate in LE therex. PT will re attempt at a later date.                  Adele Gonsalez PTA

## 2019-04-10 NOTE — PLAN OF CARE
Problem: Injury - Risk of, Physical Injury:  Goal: Absence of physical injury  Description  Absence of physical injury  Outcome: Ongoing  Note:   Pt is free of falls at this time. Bed wheels are locked, bed alarm is on, bed is in lowest position. Call light is within reach. Pt is aware of the risk for falls. Will continue hourly rounding to monitor. Problem: Risk for Impaired Skin Integrity  Goal: Tissue integrity - skin and mucous membranes  Description  Structural intactness and normal physiological function of skin and  mucous membranes. Outcome: Ongoing  Note:   Pt at risk for skin breakdown. See Aaron score. Pt remains on bedrest. Unable to reposition self in bed. Heels elevated off bed. Sacral heart mepilex intact to protect,  site inspected and intact underneath. Will continue to turn and reposition patient every two hours and as needed. Will continue to keep patient clean and dry, applying skin care cream as needed. Pillows used for repositioning q2hs. Will continue to monitor and assess for skin breakdown.

## 2019-04-10 NOTE — PROGRESS NOTES
Pt continues to be somnolent this afternoon. Able to arouse pt but quickly drifts back to sleep. PO medications will be administered when pt more alert. Restraints d/chano. Safety precautions in place. Brother at bedside; updated on current pt condition.

## 2019-04-10 NOTE — PROGRESS NOTES
Occupational Therapy  Facility/Department: [facility] ICU  Daily Treatment Note  Late entry for 1400  NAME: Arabella Renee  : 1950  MRN: [id number]    Date of Service: 4/10/2019    Discharge Recommendations:    Arabella Renee scored a 16/24 on the AM-PAC ADL Inpatient form. Current research shows that an AM-PAC score of 17 or less is typically not associated with a discharge to the patient's home setting. Based on the patients AM-PAC score and their current ADL deficits, it is recommended that the patient have 3-5 sessions per week of Occupational Therapy at d/c to increase the patients independence. OT Equipment Recommendations  Other: defer    Assessment   Performance deficits / Impairments: Decreased functional mobility ; Decreased ADL status; Decreased balance;Decreased strength;Decreased endurance  Assessment: Pt demonstrated increased sit to stand transfers and increased alertness this date. Pt demonstrated increased fatigue after functional mobility from bed to bedside chair. Pt brother in room during session providing PLOF information. Pt woudl benefit from continued OT services while in acute care, AMPA score indicates non homebound discharge. Treatment Diagnosis: Impaired ADLs, mobility, balance and activity tolerance  Prognosis: Good  Patient Education: importance of activity promotion- verb understanding  REQUIRES OT FOLLOW UP: Yes  Activity Tolerance  Activity Tolerance: Patient limited by fatigue;Patient Tolerated treatment well  Activity Tolerance: Pt noted lethargic upon transferring to bedside chair. Safety Devices  Safety Devices in place: Yes  Type of devices: Nurse notified; Chair alarm in place;Call light within reach; Left in chair(brother at bedside, RN at bedside at end of session)         Patient Diagnosis(es): There were no encounter diagnoses. has no past medical history on file. has no past surgical history on file.     Restrictions  Position Activity Treatment Minutes: 40 Minutes     If patient discharges prior to next treatment, this note will serve as discharge summary. Continue per plan of care if patient does not discharge. Alex Escobar.  [facility], OTR/L [id number]

## 2019-04-10 NOTE — PROGRESS NOTES
Pt is sitting up in chair. He continues to get up without assistance. Pt is confused and forgetful; he has dementia at baseline. Pt is pleasant and redirectable. [employer] camera, non-skid socks, and chair alarm are all on and in place. Charge RN was made aware that pt needs to be considered for a room closer to the nurses station. Will cont to monitor.

## 2019-04-10 NOTE — CONSULTS
(FLOMAX) capsule 0.4 mg  0.4 mg Oral Daily Iliana Elder MD   0.4 mg at 04/09/19 1634        Objective:  BP (!) 115/52   Pulse 81   Temp 98.8 °F (37.1 °C) (Oral)   Resp 20   Ht 5' 8.11\" (1.73 m)   Wt 225 lb 1.4 oz (102.1 kg)   SpO2 98%   BMI 34.11 kg/m²     Physical Exam:   Patient seen and examined  GCS:  4 - Opens eyes on own  4 - Seems confused, disoriented  6 - Follows simple motor commands  General: Well developed. Alert and cooperative in no acute distress. HENT: atraumatic, neck supple  Eyes: Optic discs: Not tested  Pulmonary: unlabored respiratory effort  Cardiovascular:  Warm well perfused. No peripheral edema  Gastrointestinal: abdomen soft, NT, ND    Neurological:  Mental Status: Awake, alert, oriented to self, but limited on orientation exam 2/2 baseline dementia and language barrier  Attention: Intact  Language: Exam limited for aphasia 2/2 baseline dementia and language barrier  Sensation: Intact to all extremities to light touch  Coordination: Intact    Cranial Nerves:  Cranial Nerves:  II: Visual acuity not tested, denies new visual changes / diplopia  III, IV, VI: PERRL, 3 mm bilaterally, EOMI, no nystagmus noted  V: Facial sensation intact bilaterally to touch  VII: Face symmetric  VIII: Hearing intact bilaterally to spoken voice  IX: Palate movement equal bilaterally  XI: Shoulder shrug equal bilaterally  XII: Tongue midline    Musculoskeletal:   Gait: Not tested   Assist devices: None   Tone: Normal  Motor strength:    Right  Left    Right  Left    Deltoid  5 5  Hip Flex  5 5   Biceps  5 5  Knee Extensors  5 5   Triceps  5 5  Knee Flexors  5 5   Wrist Ext  5 5  Ankle Dorsiflex. 5 5   Wrist Flex  5 5  Ankle Plantarflex. 5 5   Handgrip  5 5  Ext García Longus  5 5   Thumb Ext  5 5         Radiological Findings:    I personally reviewed the patient's imaging which consists of a CT head, CT face, CT cervical spine dated 4/8/2019.   CT head demonstrates a small left extra-axial temporoparietal subdural hematoma measuring 1.2 cm in greatest thickness that subsequently decreased in size. Also noted, is an age indeterminate left nasal bone fracture. No acute abnormalities in the cervical spine. Labs:  Recent Labs     04/09/19 0431   WBC 7.1   HGB 10.9*   HCT 31.8*          Recent Labs     04/09/19 0431      K 4.5      CO2 24   BUN 40*   CREATININE 1.5*   GLUCOSE 143*   CALCIUM 8.9   PHOS 3.7       Recent Labs     04/08/19 2135   PROTIME 11.8   INR 1.04   APTT 26.0       Patient Active Problem List    Diagnosis Date Noted    Subdural hematoma ([address] 04/08/2019       Assessment:  Patient is a 71 y.o. male w/mildly decreased left subdural hematoma    Plan:  1. No neurosurgical intervention indicated  2. Frequent neuro checks  3. SDH:  - Follow up head CT stable. No further imaging unless there is a decline in neurologic  - Maintain SBP <160; If PRN med insufficient, then may start Nicardipine infusion  - Keep Plt >100k & INR <1.4  - Hold all full dose anticoagulation & antiplatelet for 2 weeks  - HOB >30 degrees  4. SCDs for DVT prophylaxis; May start chemoprophylaxis 4/10/2019  5. Seizure prophylaxis: Keppra 500mg BID x7 days  6. PT/OT consulted, appreciate recs    Thank you for the consultation.     Dyann Ganser, MD, PhD  [address], [address], [address] [phone] (c), [phone] (o)

## 2019-04-10 NOTE — PROGRESS NOTES
Resident Progress Note    Admit Date: 2019    [facility]                  : 1950  MRN: [id number]    CC:    Interval History:74 yo M PMH of CAD s/p CABG, DMII, HTN, Dementia, p/w fall from wheel chair at [place], found to have SDH, no surgical intervention needed. Subjective:   Aggressive overnight, got haldol and Zyprexa last night. Somnolent this morning when first seen. More alert when seen again this afternoon. History limited due to dementia. Data:   Scheduled Meds:   sodium chloride flush  10 mL Intravenous 2 times per day    levetiracetam  500 mg Intravenous Q12H    insulin lispro  0-6 Units Subcutaneous TID WC    insulin lispro  0-3 Units Subcutaneous Nightly    famotidine (PEPCID) injection  20 mg Intravenous BID    atorvastatin  80 mg Oral Nightly    divalproex  250 mg Oral Nightly    finasteride  5 mg Oral Daily    furosemide  20 mg Oral Daily    lisinopril  40 mg Oral Daily    metoprolol succinate  125 mg Oral Daily    sertraline  150 mg Oral Daily    tamsulosin  0.4 mg Oral Daily     Continuous Infusions:   dextrose       PRN Meds:OLANZapine, OLANZapine, sodium chloride flush, magnesium hydroxide, ondansetron, glucose, dextrose, glucagon (rDNA), dextrose, labetalol  I/O last 3 completed shifts: In: 1300 [P.O.:1200; I.V.:100]  Out: 500 [Urine:500]  No intake/output data recorded. Intake/Output Summary (Last 24 hours) at 4/10/2019 1129  Last data filed at 2019 1900  Gross per 24 hour   Intake 1300 ml   Output 500 ml   Net 800 ml           Objective:     Vitals: BP (!) 128/55   Pulse 61   Temp 98.1 °F (36.7 °C) (Axillary)   Resp 22   Ht 5' 8.11\" (1.73 m)   Wt 225 lb 1.4 oz (102.1 kg)   SpO2 97%   BMI 34.11 kg/m²     Physical Exam:  General appearance:  Appears comfortable. Well nourished. Eyes: Sclera clear, pupils equal, round, and reactive to light. ENT: Moist mucus membranes, no thrush. Trachea midline.   Cardiovascular: Regular rhythm, chemo DVT ppx on 4/10/19 - will start heparin subq              - keppra 500 BID day 2/7              - neurocheck q4H  - SLP eval  - PT/OT  - labetalol 10 q6H PRN for SBP >160     QUIQUE, resolved  Cr now back at baseline 1.5.   - [email or web address]     CAD s/p CABG  - holding plavix/ASA for 2 weeks  - pharm to med rec     DMII  Home meds without any DM medication.  - pharm to med rec  - LDSSI  - hypoglycemia protocol  - accucheck qACHS  - carb cont diet     HTN  Home meds without HTN meds  - Toprol 125 mg  - lisinopril 40 mg  - SBP < 160 with PRN labetalol     HLD  -pharm to med rec     HFrEF  Last echo in care everywhere in 2015, not able to view results. Per cardiology note on 2/2017 - has EF 45% and was supposed to be on lasix 20 daily.    - repeat echo  - pharm to med rec     Depression  - on Zoloft and depaote    Code status: Full Code  FEN: DIET GENERAL;  PPx: SCDs  Discharge planning: CM following, plan for SNF    This patient will be staffed with Carolina Nobles MD.     [address]  Internal Medicine Resident, PGY-2  4/10/2019  11:29 AM

## 2019-04-10 NOTE — PROGRESS NOTES
Attempted to assess pt and get vital signs, but he continues to be aggressive. HR is stable in the 70s.

## 2019-04-10 NOTE — PROGRESS NOTES
Pt was found attempting to get out of bed. When attempting to redirect the pt, he became physically aggressive with nursing staff; punching, kicking, thrashing in the bed. Multiple nurses had to hold the patient in the bed and bilateral soft wrist restraints were placed. ICU residents and security were called to the bedside. In this process, the patient was incontinent of urine. He was not cooperating with staff and was refusing to be changed. Pt's brother was called and updated by the ICU residents. 10mg Zyprexa was given. Dr. Julienne Valles was made aware. Will cont to monitor.

## 2019-04-10 NOTE — PROGRESS NOTES
Speech Language Pathology  Attempt Note    Attempted to see pt for dysphagia therapy. Performed chart review, spoke with RN. Pt sleeping, and not sufficiently alert for therapy at this time. Will re-attempt later as able.     Malcolm Jasmine M.A., Manolo Johnson   Speech-Language Pathologist

## 2019-04-10 NOTE — PROGRESS NOTES
Pt very somnolent this morning. Pt able to open eyes to name but falls asleep very quickly. This RN does not feel safe to adminster PO medications at this time. Will administer PO medications when pt is able to stay awake.

## 2019-04-11 PROBLEM — N18.30 CKD (CHRONIC KIDNEY DISEASE), STAGE III (HCC): Status: ACTIVE | Noted: 2019-04-11

## 2019-04-11 PROBLEM — F03.918 DEMENTIA WITH BEHAVIORAL DISTURBANCE: Chronic | Status: ACTIVE | Noted: 2019-04-11

## 2019-04-11 PROBLEM — F03.90 DEMENTIA (HCC): Chronic | Status: ACTIVE | Noted: 2019-04-11

## 2019-04-11 PROBLEM — I25.10 CORONARY ARTERY DISEASE INVOLVING NATIVE CORONARY ARTERY: Status: ACTIVE | Noted: 2019-04-11

## 2019-04-11 PROBLEM — E11.65 UNCONTROLLED TYPE 2 DIABETES MELLITUS WITH HYPERGLYCEMIA (HCC): Status: ACTIVE | Noted: 2019-04-11

## 2019-04-11 LAB
ALBUMIN SERPL-MCNC: 3.7 G/DL (ref 3.4–5)
ANION GAP SERPL CALCULATED.3IONS-SCNC: 13 MMOL/L (ref 3–16)
BASOPHILS ABSOLUTE: 0 K/UL (ref 0–0.2)
BASOPHILS RELATIVE PERCENT: 0.4 %
BUN BLDV-MCNC: 37 MG/DL (ref 7–20)
CALCIUM SERPL-MCNC: 8.5 MG/DL (ref 8.3–10.6)
CHLORIDE BLD-SCNC: 106 MMOL/L (ref 99–110)
CO2: 21 MMOL/L (ref 21–32)
CREAT SERPL-MCNC: 1.8 MG/DL (ref 0.8–1.3)
EOSINOPHILS ABSOLUTE: 0.1 K/UL (ref 0–0.6)
EOSINOPHILS RELATIVE PERCENT: 1.8 %
GFR AFRICAN AMERICAN: 45
GFR NON-AFRICAN AMERICAN: 38
GLUCOSE BLD-MCNC: 148 MG/DL (ref 70–99)
GLUCOSE BLD-MCNC: 162 MG/DL (ref 70–99)
GLUCOSE BLD-MCNC: 163 MG/DL (ref 70–99)
GLUCOSE BLD-MCNC: 180 MG/DL (ref 70–99)
GLUCOSE BLD-MCNC: 273 MG/DL (ref 70–99)
HCT VFR BLD CALC: 29 % (ref 40.5–52.5)
HEMOGLOBIN: 9.8 G/DL (ref 13.5–17.5)
LYMPHOCYTES ABSOLUTE: 1.1 K/UL (ref 1–5.1)
LYMPHOCYTES RELATIVE PERCENT: 18.3 %
MCH RBC QN AUTO: 30.8 PG (ref 26–34)
MCHC RBC AUTO-ENTMCNC: 33.7 G/DL (ref 31–36)
MCV RBC AUTO: 91.4 FL (ref 80–100)
MONOCYTES ABSOLUTE: 0.5 K/UL (ref 0–1.3)
MONOCYTES RELATIVE PERCENT: 7.9 %
NEUTROPHILS ABSOLUTE: 4.3 K/UL (ref 1.7–7.7)
NEUTROPHILS RELATIVE PERCENT: 71.6 %
PDW BLD-RTO: 14.1 % (ref 12.4–15.4)
PERFORMED ON: ABNORMAL
PHOSPHORUS: 3.9 MG/DL (ref 2.5–4.9)
PLATELET # BLD: 160 K/UL (ref 135–450)
PMV BLD AUTO: 7.4 FL (ref 5–10.5)
POTASSIUM SERPL-SCNC: 4.7 MMOL/L (ref 3.5–5.1)
RBC # BLD: 3.17 M/UL (ref 4.2–5.9)
SODIUM BLD-SCNC: 140 MMOL/L (ref 136–145)
WBC # BLD: 6.1 K/UL (ref 4–11)

## 2019-04-11 PROCEDURE — 6370000000 HC RX 637 (ALT 250 FOR IP): Performed by: INTERNAL MEDICINE

## 2019-04-11 PROCEDURE — 2580000003 HC RX 258: Performed by: STUDENT IN AN ORGANIZED HEALTH CARE EDUCATION/TRAINING PROGRAM

## 2019-04-11 PROCEDURE — 1200000000 HC SEMI PRIVATE

## 2019-04-11 PROCEDURE — 6360000002 HC RX W HCPCS: Performed by: STUDENT IN AN ORGANIZED HEALTH CARE EDUCATION/TRAINING PROGRAM

## 2019-04-11 PROCEDURE — 85025 COMPLETE CBC W/AUTO DIFF WBC: CPT

## 2019-04-11 PROCEDURE — 6360000002 HC RX W HCPCS: Performed by: INTERNAL MEDICINE

## 2019-04-11 PROCEDURE — 92526 ORAL FUNCTION THERAPY: CPT

## 2019-04-11 PROCEDURE — 97530 THERAPEUTIC ACTIVITIES: CPT

## 2019-04-11 PROCEDURE — 2500000003 HC RX 250 WO HCPCS: Performed by: STUDENT IN AN ORGANIZED HEALTH CARE EDUCATION/TRAINING PROGRAM

## 2019-04-11 PROCEDURE — 97535 SELF CARE MNGMENT TRAINING: CPT

## 2019-04-11 PROCEDURE — 80069 RENAL FUNCTION PANEL: CPT

## 2019-04-11 PROCEDURE — 36415 COLL VENOUS BLD VENIPUNCTURE: CPT

## 2019-04-11 PROCEDURE — 97116 GAIT TRAINING THERAPY: CPT

## 2019-04-11 PROCEDURE — 6370000000 HC RX 637 (ALT 250 FOR IP): Performed by: STUDENT IN AN ORGANIZED HEALTH CARE EDUCATION/TRAINING PROGRAM

## 2019-04-11 RX ADMIN — FAMOTIDINE 20 MG: 10 INJECTION, SOLUTION INTRAVENOUS at 20:07

## 2019-04-11 RX ADMIN — Medication 10 ML: at 22:21

## 2019-04-11 RX ADMIN — FAMOTIDINE 20 MG: 10 INJECTION, SOLUTION INTRAVENOUS at 09:00

## 2019-04-11 RX ADMIN — INSULIN LISPRO 2 UNITS: 100 INJECTION, SOLUTION INTRAVENOUS; SUBCUTANEOUS at 20:07

## 2019-04-11 RX ADMIN — ACETAMINOPHEN 650 MG: 325 TABLET ORAL at 12:37

## 2019-04-11 RX ADMIN — ATORVASTATIN CALCIUM 80 MG: 80 TABLET, FILM COATED ORAL at 20:07

## 2019-04-11 RX ADMIN — HEPARIN SODIUM 5000 UNITS: 5000 INJECTION INTRAVENOUS; SUBCUTANEOUS at 14:51

## 2019-04-11 RX ADMIN — INSULIN LISPRO 1 UNITS: 100 INJECTION, SOLUTION INTRAVENOUS; SUBCUTANEOUS at 18:27

## 2019-04-11 RX ADMIN — SERTRALINE HYDROCHLORIDE 150 MG: 100 TABLET ORAL at 09:00

## 2019-04-11 RX ADMIN — HEPARIN SODIUM 5000 UNITS: 5000 INJECTION INTRAVENOUS; SUBCUTANEOUS at 05:20

## 2019-04-11 RX ADMIN — Medication 10 ML: at 09:01

## 2019-04-11 RX ADMIN — DIVALPROEX SODIUM 250 MG: 250 TABLET, EXTENDED RELEASE ORAL at 20:07

## 2019-04-11 RX ADMIN — METOPROLOL SUCCINATE 125 MG: 100 TABLET, EXTENDED RELEASE ORAL at 12:51

## 2019-04-11 RX ADMIN — TAMSULOSIN HYDROCHLORIDE 0.4 MG: 0.4 CAPSULE ORAL at 09:00

## 2019-04-11 RX ADMIN — LEVETIRACETAM 500 MG: 100 INJECTION, SOLUTION INTRAVENOUS at 03:23

## 2019-04-11 RX ADMIN — LEVETIRACETAM 500 MG: 100 INJECTION, SOLUTION INTRAVENOUS at 16:36

## 2019-04-11 RX ADMIN — FINASTERIDE 5 MG: 5 TABLET, FILM COATED ORAL at 09:00

## 2019-04-11 RX ADMIN — INSULIN LISPRO 1 UNITS: 100 INJECTION, SOLUTION INTRAVENOUS; SUBCUTANEOUS at 09:01

## 2019-04-11 RX ADMIN — ACETAMINOPHEN 650 MG: 325 TABLET ORAL at 16:40

## 2019-04-11 RX ADMIN — HEPARIN SODIUM 5000 UNITS: 5000 INJECTION INTRAVENOUS; SUBCUTANEOUS at 20:07

## 2019-04-11 RX ADMIN — INSULIN LISPRO 1 UNITS: 100 INJECTION, SOLUTION INTRAVENOUS; SUBCUTANEOUS at 12:39

## 2019-04-11 RX ADMIN — FUROSEMIDE 20 MG: 20 TABLET ORAL at 09:00

## 2019-04-11 ASSESSMENT — PAIN - FUNCTIONAL ASSESSMENT
PAIN_FUNCTIONAL_ASSESSMENT: PREVENTS OR INTERFERES SOME ACTIVE ACTIVITIES AND ADLS
PAIN_FUNCTIONAL_ASSESSMENT: PREVENTS OR INTERFERES WITH MANY ACTIVE NOT PASSIVE ACTIVITIES

## 2019-04-11 ASSESSMENT — PAIN DESCRIPTION - DIRECTION
RADIATING_TOWARDS: HEAD
RADIATING_TOWARDS: FRONT OF HEAD

## 2019-04-11 ASSESSMENT — PAIN DESCRIPTION - ORIENTATION
ORIENTATION: ANTERIOR;MID
ORIENTATION: ANTERIOR

## 2019-04-11 ASSESSMENT — PAIN SCALES - GENERAL
PAINLEVEL_OUTOF10: 2
PAINLEVEL_OUTOF10: 0
PAINLEVEL_OUTOF10: 3
PAINLEVEL_OUTOF10: 0

## 2019-04-11 ASSESSMENT — PAIN DESCRIPTION - ONSET
ONSET: ON-GOING
ONSET: AWAKENED FROM SLEEP

## 2019-04-11 ASSESSMENT — PAIN DESCRIPTION - PAIN TYPE
TYPE: ACUTE PAIN
TYPE: ACUTE PAIN

## 2019-04-11 ASSESSMENT — PAIN DESCRIPTION - FREQUENCY
FREQUENCY: CONTINUOUS
FREQUENCY: CONTINUOUS

## 2019-04-11 ASSESSMENT — PAIN DESCRIPTION - PROGRESSION
CLINICAL_PROGRESSION: GRADUALLY WORSENING
CLINICAL_PROGRESSION: NOT CHANGED

## 2019-04-11 ASSESSMENT — PAIN DESCRIPTION - DESCRIPTORS
DESCRIPTORS: HEADACHE
DESCRIPTORS: HEADACHE;ACHING

## 2019-04-11 ASSESSMENT — PAIN DESCRIPTION - LOCATION
LOCATION: HEAD
LOCATION: HEAD;FACE

## 2019-04-11 NOTE — PLAN OF CARE
Problem: ACTIVITY INTOLERANCE/IMPAIRED MOBILITY  Goal: Mobility/activity is maintained at optimum level for patient  Outcome: Ongoing  Note:   Pt walked in hallway with PT/OT this afternoon. Tolerated fairly well. Will monitor. Problem: Injury - Risk of, Physical Injury:  Goal: Absence of physical injury  Description  Absence of physical injury  Outcome: Ongoing  Note:   Pt is a fall risk. Fall risk protocol in place. See Bertin Richard Fall Score. Pt bed is in low position, bed alarm is on, side rails up, fall risk bracelet applied. , non-skid footwear in use. Patient/family educated on fall risk protocol, instructed to call for assistance when needed and belongings are in reach. assistance. Will continue with hourly rounds for po intake, pain needs, toileting and repositioning as needed. Will continue to monitor for needs. Problem: Risk for Impaired Skin Integrity  Goal: Tissue integrity - skin and mucous membranes  Description  Structural intactness and normal physiological function of skin and  mucous membranes. Outcome: Ongoing  Note:   Pt at risk for skin breakdown. See Aaron score. Pt is able to reposition self lf in bed. Heels elevated off bed. Sacral heart mepilex intact to protect, site inspected and intact blanchable redness noted underneath. Will continue to turn and reposition patient every two hours and as needed. Will continue to keep patient clean and dry, applying skin care cream as needed. Pillows used for repositioning q2hs. Will continue to monitor and assess for skin breakdown. Problem: Falls - Risk of:  Goal: Absence of physical injury  Description  Absence of physical injury  Outcome: Ongoing  Note:   Pt is a fall risk. Fall risk protocol in place. See Bertin Richard Fall Score. Pt bed is in low position, bed alarm is on, side rails up, fall risk bracelet applied. , non-skid footwear in use.  Patient/family educated on fall risk protocol, instructed to call for assistance when needed and belongings are in reach. assistance. Will continue with hourly rounds for po intake, pain needs, toileting and repositioning as needed. Will continue to monitor for needs.

## 2019-04-11 NOTE — PROGRESS NOTES
Speech Language Pathology  Facility/Department: [facility] ICU  Dysphagia Daily Treatment Note    NAME: Aretha Mccoy  : 1950  MRN: [id number]    Patient Diagnosis(es):   Patient Active Problem List    Diagnosis Date Noted    Subdural hematoma ([facility] [address] 2019     Allergies: Allergies   Allergen Reactions    Fluvastatin      UNKNOWN RE[facility],  [facility]     Recent Chest Xray 19  FINDINGS:   The lung volumes are low.  The study is also limited due to body habitus an   AP technique.  The heart size is within normal limits for technique.  No   overt edema, large effusion or lobar consolidation is identified.            CT of head 19  Acute left extra-axial posterior temporoparietal hematoma most consistent   with subdural hematoma measuring up to 1.2 cm in thickness.  A portion of the   hematoma has a biconvex configuration and an epidural component cannot be   entirely excluded.  Close follow-up with repeat CT head recommended.                          Chart reviewed. Medical Diagnosis:SDH  Treatment Diagnosis:dysphagia     BSE Impression 19  Pt alert, sitting up in chair, following commands and answering questions appropriately. Oral- ROM WFL, no difficulty with mastication or anterior spillage. Pt noted to take very large bites of applesauce. Pharyngeal- with first two trials with water by straw and cup, pt noted to have delayed cough/ throat clear and belch. Pt endorsed that it didn't feel as if it \"went down the right way\". Pt analyzed with water by cup and straw through out the session (6 oz total) and again 20 minutes later (SLP had to leave for MBS), but no s/s aspiration observed. Pt demonstrated no s/s aspiration with pureed or cracker. Vocal quality remained clear through out the assessment.         MBS results - not warranted this date    Pain: denied     Current Diet : regular with thin liquis     Treatment:  Pt seen bedside to address the following goals:  1- The patient will tolerate recommended diet without observed clinical signs of aspiration  4/11- goal met-  Pt reported no difficulty with swallowing. Lungs clear per chart. Pt analyzed with breakfast. Pt consumed scrambled eggs, sausage and toast as well as soda via straw. Mastication of solids was functional. No s/s aspiration with any consistency- even when took successive swallows of soda by straw. Voice remained clear through out the session. con't goal.      2- The pt/family will demonstrate understanding of swallowing recommendations and concerns. 4/9-The pt and brother were educated to purpose of the visit, anatomy and physiology of the swallow, concerns for aspiration, swallowing strategies, diet recommendations and possibility of being made NPO if s/s aspiration emerge. Both stated comprehension, but the pt would benefit from reinforcement. con't goal   4/11- no family present this date. Pt educated to purpose of the visit, swallowing strategies and rational for strategies. Pt stated comprehension but would benefit from reinforcement. Con't goal        Patient/Family/Caregiver Education:  . Small bites/sips;(pt tends to take large bites)  Upright as possible for all oral intake;  Remain upright for 30-45 minutes after meals        Compensatory Strategies:       Plan:  Continued daily Dysphagia treatment with goals per  plan of care. Diet recommendations:regular with thin liquids   DC recommendation:TBD closer to discharge   Treatment: 15  D/W nursingAj   Needs met prior to leaving room, call button in reach.     Isaac Gell, [place], [address]  Speech-Language Pathologist  Pager [phone]      If patient is discharged prior to next treatment, this note will serve as the discharge summary

## 2019-04-11 NOTE — PROGRESS NOTES
General  Chart Reviewed: Yes  Additional Pertinent Hx: Pt admit 4/9 after a fall at grocery store. CT head:  Acute left extra-axial posterior temporoparietal subdural hematoma. Seen by neurosx and no sx indicated PMHx: dementia  Family / Caregiver Present: Yes(brother)  Diagnosis: SDH  Subjective  Subjective: Pt seated in chair. \"I don't know how I'm doing. Only God knows. \" Reports chronic pain in R wrist and neck, RN medicated      Orientation  Orientation  Orientation Level: Disoriented to situation;Disoriented to time;Oriented to place;Oriented to person  Objective    ADL  LE Dressing: Minimal assistance(don pants)  Toileting: Contact guard assistance        Balance  Sitting Balance: Supervision  Standing Balance: Contact guard assistance(static stance w. RW)  Standing Balance  Time: 5 min total  Activity: bathroom/hallway mobility, toileting  Sit to stand: Contact guard assistance(recliner)  Stand to sit: Contact guard assistance(recliner)  Functional Mobility  Functional - Mobility Device: Rolling Walker  Activity: To/from bathroom; Other  Assist Level: Minimal assistance  Functional Mobility Comments: maximal cues for safe walker management when turning and around obstacles in bathroom. Pt had 2 LOB requiring cga to correct initial LOB and min A to correct second LOB to L  Toilet Transfers  Toilet - Technique: Ambulating(w/ RW)  Equipment Used: Standard toilet(grab bar)  Toilet Transfer: Contact guard assistance     Transfers  Sit to stand: Contact guard assistance(recliner)  Stand to sit: Contact guard assistance(recliner)  Transfer Comments: maximal cues for safe hand placement and sequening transfers with walker                       Cognition  Following Commands: Follows one step commands with increased time; Follows one step commands with repetition  Memory: Decreased short term memory;Decreased recall of recent events  Safety Judgement: Decreased awareness of need for safety;Decreased awareness of need

## 2019-04-11 NOTE — CARE COORDINATION
Case Management Daily Note:    Current Plan of Care: monitoring neuro exam      PT AM-PAC:16 /24  Per last eval on:  4/11/19    OT AM-PAC: 16/24 Per last eval on: 4/10/19    DME needs: TBD      Discharge Plan: To SNF    Tentative Discharge Date:  4/12/19    Current Barriers to Discharge:  placement    Resources/Information given: [facility] SNF list      Case Management Notes:     Met with patient and his brother and [facility]. Sp[facility] garcia [facility] this morning and as of clinicals today he does not meet criteria for the transitional unit on [place] [address]. Updates faxed this afternoon and will be re-evaluated tomorrow morning in team conference. [address] can accept patient. [facility] does not have a male bed available.

## 2019-04-12 VITALS
SYSTOLIC BLOOD PRESSURE: 128 MMHG | HEART RATE: 77 BPM | RESPIRATION RATE: 16 BRPM | WEIGHT: 199.74 LBS | OXYGEN SATURATION: 100 % | TEMPERATURE: 98.4 F | BODY MASS INDEX: 30.27 KG/M2 | DIASTOLIC BLOOD PRESSURE: 66 MMHG | HEIGHT: 68 IN

## 2019-04-12 LAB
ALBUMIN SERPL-MCNC: 3.3 G/DL (ref 3.4–5)
ANION GAP SERPL CALCULATED.3IONS-SCNC: 10 MMOL/L (ref 3–16)
BASOPHILS ABSOLUTE: 0 K/UL (ref 0–0.2)
BASOPHILS RELATIVE PERCENT: 0.3 %
BUN BLDV-MCNC: 33 MG/DL (ref 7–20)
CALCIUM SERPL-MCNC: 8.1 MG/DL (ref 8.3–10.6)
CHLORIDE BLD-SCNC: 107 MMOL/L (ref 99–110)
CO2: 23 MMOL/L (ref 21–32)
CREAT SERPL-MCNC: 1.7 MG/DL (ref 0.8–1.3)
EOSINOPHILS ABSOLUTE: 0.1 K/UL (ref 0–0.6)
EOSINOPHILS RELATIVE PERCENT: 2.6 %
GFR AFRICAN AMERICAN: 49
GFR NON-AFRICAN AMERICAN: 40
GLUCOSE BLD-MCNC: 110 MG/DL (ref 70–99)
GLUCOSE BLD-MCNC: 134 MG/DL (ref 70–99)
GLUCOSE BLD-MCNC: 146 MG/DL (ref 70–99)
GLUCOSE BLD-MCNC: 261 MG/DL (ref 70–99)
HCT VFR BLD CALC: 27.4 % (ref 40.5–52.5)
HEMOGLOBIN: 9.3 G/DL (ref 13.5–17.5)
LYMPHOCYTES ABSOLUTE: 1.5 K/UL (ref 1–5.1)
LYMPHOCYTES RELATIVE PERCENT: 27.7 %
MCH RBC QN AUTO: 31.1 PG (ref 26–34)
MCHC RBC AUTO-ENTMCNC: 34 G/DL (ref 31–36)
MCV RBC AUTO: 91.3 FL (ref 80–100)
MONOCYTES ABSOLUTE: 0.4 K/UL (ref 0–1.3)
MONOCYTES RELATIVE PERCENT: 8.2 %
NEUTROPHILS ABSOLUTE: 3.3 K/UL (ref 1.7–7.7)
NEUTROPHILS RELATIVE PERCENT: 61.2 %
PDW BLD-RTO: 13.7 % (ref 12.4–15.4)
PERFORMED ON: ABNORMAL
PHOSPHORUS: 3.2 MG/DL (ref 2.5–4.9)
PLATELET # BLD: 146 K/UL (ref 135–450)
PMV BLD AUTO: 6.9 FL (ref 5–10.5)
POTASSIUM SERPL-SCNC: 4.3 MMOL/L (ref 3.5–5.1)
RBC # BLD: 3 M/UL (ref 4.2–5.9)
SODIUM BLD-SCNC: 140 MMOL/L (ref 136–145)
WBC # BLD: 5.5 K/UL (ref 4–11)

## 2019-04-12 PROCEDURE — 6360000002 HC RX W HCPCS: Performed by: INTERNAL MEDICINE

## 2019-04-12 PROCEDURE — 2500000003 HC RX 250 WO HCPCS: Performed by: STUDENT IN AN ORGANIZED HEALTH CARE EDUCATION/TRAINING PROGRAM

## 2019-04-12 PROCEDURE — 2580000003 HC RX 258: Performed by: STUDENT IN AN ORGANIZED HEALTH CARE EDUCATION/TRAINING PROGRAM

## 2019-04-12 PROCEDURE — 99222 1ST HOSP IP/OBS MODERATE 55: CPT | Performed by: OTOLARYNGOLOGY

## 2019-04-12 PROCEDURE — 36415 COLL VENOUS BLD VENIPUNCTURE: CPT

## 2019-04-12 PROCEDURE — 6370000000 HC RX 637 (ALT 250 FOR IP): Performed by: STUDENT IN AN ORGANIZED HEALTH CARE EDUCATION/TRAINING PROGRAM

## 2019-04-12 PROCEDURE — 6360000002 HC RX W HCPCS: Performed by: STUDENT IN AN ORGANIZED HEALTH CARE EDUCATION/TRAINING PROGRAM

## 2019-04-12 PROCEDURE — 80069 RENAL FUNCTION PANEL: CPT

## 2019-04-12 PROCEDURE — 85025 COMPLETE CBC W/AUTO DIFF WBC: CPT

## 2019-04-12 RX ORDER — ARIPIPRAZOLE 15 MG/1
15 TABLET ORAL DAILY
Qty: 30 TABLET | Refills: 0 | Status: SHIPPED | OUTPATIENT
Start: 2019-04-12 | End: 2019-04-12 | Stop reason: SDUPTHER

## 2019-04-12 RX ORDER — LISINOPRIL 20 MG/1
20 TABLET ORAL DAILY
Qty: 30 TABLET | Refills: 3 | Status: SHIPPED | OUTPATIENT
Start: 2019-04-13 | End: 2019-04-12

## 2019-04-12 RX ORDER — LEVETIRACETAM 500 MG/1
500 TABLET ORAL 2 TIMES DAILY
Qty: 60 TABLET | Refills: 3 | Status: SHIPPED | OUTPATIENT
Start: 2019-04-12 | End: 2019-04-12 | Stop reason: SDUPTHER

## 2019-04-12 RX ORDER — LISINOPRIL 20 MG/1
20 TABLET ORAL DAILY
Status: DISCONTINUED | OUTPATIENT
Start: 2019-04-13 | End: 2019-04-12 | Stop reason: HOSPADM

## 2019-04-12 RX ORDER — LISINOPRIL 20 MG/1
20 TABLET ORAL DAILY
Qty: 30 TABLET | Refills: 3 | Status: SHIPPED | OUTPATIENT
Start: 2019-04-13

## 2019-04-12 RX ORDER — ARIPIPRAZOLE 15 MG/1
15 TABLET ORAL DAILY
Qty: 30 TABLET | Refills: 0 | Status: SHIPPED | OUTPATIENT
Start: 2019-04-12

## 2019-04-12 RX ORDER — LEVETIRACETAM 500 MG/1
500 TABLET ORAL 2 TIMES DAILY
Qty: 60 TABLET | Refills: 3 | Status: SHIPPED | OUTPATIENT
Start: 2019-04-12 | End: 2019-04-26

## 2019-04-12 RX ADMIN — FUROSEMIDE 20 MG: 20 TABLET ORAL at 09:22

## 2019-04-12 RX ADMIN — HEPARIN SODIUM 5000 UNITS: 5000 INJECTION INTRAVENOUS; SUBCUTANEOUS at 06:13

## 2019-04-12 RX ADMIN — LEVETIRACETAM 500 MG: 100 INJECTION, SOLUTION INTRAVENOUS at 16:43

## 2019-04-12 RX ADMIN — INSULIN LISPRO 3 UNITS: 100 INJECTION, SOLUTION INTRAVENOUS; SUBCUTANEOUS at 12:47

## 2019-04-12 RX ADMIN — LISINOPRIL 40 MG: 40 TABLET ORAL at 09:22

## 2019-04-12 RX ADMIN — Medication 10 ML: at 09:37

## 2019-04-12 RX ADMIN — LEVETIRACETAM 500 MG: 100 INJECTION, SOLUTION INTRAVENOUS at 03:41

## 2019-04-12 RX ADMIN — SERTRALINE HYDROCHLORIDE 150 MG: 100 TABLET ORAL at 09:24

## 2019-04-12 RX ADMIN — METOPROLOL SUCCINATE 125 MG: 100 TABLET, EXTENDED RELEASE ORAL at 09:22

## 2019-04-12 RX ADMIN — FINASTERIDE 5 MG: 5 TABLET, FILM COATED ORAL at 09:22

## 2019-04-12 RX ADMIN — FAMOTIDINE 20 MG: 10 INJECTION, SOLUTION INTRAVENOUS at 09:23

## 2019-04-12 RX ADMIN — HEPARIN SODIUM 5000 UNITS: 5000 INJECTION INTRAVENOUS; SUBCUTANEOUS at 16:43

## 2019-04-12 RX ADMIN — TAMSULOSIN HYDROCHLORIDE 0.4 MG: 0.4 CAPSULE ORAL at 09:33

## 2019-04-12 ASSESSMENT — PAIN SCALES - GENERAL
PAINLEVEL_OUTOF10: 0

## 2019-04-12 NOTE — PROGRESS NOTES
Resident Progress Note    Admit Date: 2019    PCP: [facility]                  : 1950  MRN: [id number]    CC: Fall from wheelchair, SDH    Interval History:74 yo M PMH of CAD s/p CABG, DMII, HTN, Dementia, p/w fall from wheel chair at [facility], found to have SDH, no surgical intervention needed. Subjective:   No acute events overnight. No chest pain, SOB, or cough, no abdominal pain, no issues with bowels or dysuria. Data:   Scheduled Meds:   [START ON 2019] lisinopril  20 mg Oral Daily    heparin (porcine)  5,000 Units Subcutaneous 3 times per day    sodium chloride flush  10 mL Intravenous 2 times per day    levetiracetam  500 mg Intravenous Q12H    insulin lispro  0-6 Units Subcutaneous TID WC    insulin lispro  0-3 Units Subcutaneous Nightly    famotidine (PEPCID) injection  20 mg Intravenous BID    atorvastatin  80 mg Oral Nightly    divalproex  250 mg Oral Nightly    finasteride  5 mg Oral Daily    furosemide  20 mg Oral Daily    metoprolol succinate  125 mg Oral Daily    sertraline  150 mg Oral Daily    tamsulosin  0.4 mg Oral Daily     Continuous Infusions:   dextrose       PRN Meds:OLANZapine, acetaminophen, sodium chloride flush, magnesium hydroxide, ondansetron, glucose, dextrose, glucagon (rDNA), dextrose, labetalol  I/O last 3 completed shifts: In: 2014.9 [P.O.:1440; I.V.:574.9]  Out: 2375 [Urine:2375]  No intake/output data recorded. Intake/Output Summary (Last 24 hours) at 2019 1008  Last data filed at 2019 0700  Gross per 24 hour   Intake 1526.85 ml   Output 1875 ml   Net -348.15 ml           Objective:     Vitals: BP (!) 102/44   Pulse 60   Temp 98 °F (36.7 °C) (Oral)   Resp 17   Ht 5' 8.11\" (1.73 m)   Wt 199 lb 11.8 oz (90.6 kg)   SpO2 95%   BMI 30.27 kg/m²     Physical Exam:  General appearance:  Appears comfortable. Well nourished. Eyes: Sclera clear, pupils equal, round, and reactive to light.   ENT: Moist mucus membranes, no thrush. Trachea midline. Cardiovascular: Regular rhythm, normal S1, S2. No murmur, gallop, rub. No edema in lower extremities. Respiratory: Clear to auscultation bilaterally, no wheeze, good inspiratory effort. Gastrointestinal: Abdomen soft, non-tender, not distended, normal bowel sounds. Musculoskeletal: No cyanosis in digits, neck supple. Neurology: Cranial nerves grossly intact. Alert, up in bed, no gross deficits in strength or sensation. Psychiatry: Appropriate affect. Not agitated. Skin: Warm, dry, normal turgor, no rash. Bruises  On face under eyes and across nose. LABS:    CBC:   Recent Labs     04/10/19  0438 04/11/19  0522 04/12/19  0344   WBC 5.5 6.1 5.5   HGB 10.5* 9.8* 9.3*   HCT 30.6* 29.0* 27.4*   MCV 90.3 91.4 91.3    160 146                                                                BMP:    Recent Labs     04/10/19  0438 04/11/19  0522 04/12/19  0344    140 140   K 4.0 4.7 4.3    106 107   CO2 22 21 23   BUN 33* 37* 33*   CREATININE 1.5* 1.8* 1.7*   GLUCOSE 167* 162* 134*       LFT's: No results for input(s): AST, ALT, ALB, BILITOT, ALKPHOS in the last 72 hours. Troponin:   No results for input(s): TROPONINI in the last 72 hours. BNP: No results for input(s): BNP in the last 72 hours. Lipids: No results for input(s): CHOL, HDL in the last 72 hours. Invalid input(s): LDLCALCU    ABGs: No results found for: PHART, OPU2QOH, PO2ART    INR:   No results for input(s): INR in the last 72 hours. U/A:  No results for input(s): NITRITE, COLORU, PHUR, LABCAST, WBCUA, RBCUA, MUCUS, TRICHOMONAS, YEAST, BACTERIA, CLARITYU, SPECGRAV, LEUKOCYTESUR, UROBILINOGEN, BILIRUBINUR, BLOODU, GLUCOSEU, AMORPHOUS in the last 72 hours.     Invalid input(s): Cristopher Baldwin   -----------------------------------------------------------------  RAD:   CT head WO contrast   Final Result            Assessment/Plan:   Lacho Macario is a 71 y.o. male, who was admitted with

## 2019-04-12 NOTE — CONSULTS
NELOSN LANGFORD M.D. Consult Note      CHIEF COMPLAINT:  Facial trauma    HISTORY OF PRESENT ILLNESS:      The patient is a 71 y.o. male who presented to the ED with a fall suffering head and face trauma. He was diagnosed with a subdural hematoma and admitted to [facility] ICU. On imaging an incidental nasal fracture was noted. The patient is not well oriented and does not give a good medical history but he denies nasal bleeding, nasal obstruction, diplopea.       Current Medications:   Current Facility-Administered Medications: OLANZapine (ZYPREXA) injection 10 mg, 10 mg, Intramuscular, Q8H PRN  heparin (porcine) injection 5,000 Units, 5,000 Units, Subcutaneous, 3 times per day  acetaminophen (TYLENOL) tablet 650 mg, 650 mg, Oral, Q4H PRN  sodium chloride flush 0.9 % injection 10 mL, 10 mL, Intravenous, 2 times per day  sodium chloride flush 0.9 % injection 10 mL, 10 mL, Intravenous, PRN  magnesium hydroxide (MILK OF MAGNESIA) 400 MG/5ML suspension 30 mL, 30 mL, Oral, Daily PRN  ondansetron (ZOFRAN) injection 4 mg, 4 mg, Intravenous, Q6H PRN  levETIRAcetam (KEPPRA) 500 mg in sodium chloride 0.9 % 100 mL IVPB, 500 mg, Intravenous, Q12H  glucose (GLUTOSE) 40 % oral gel 15 g, 15 g, Oral, PRN  dextrose 50 % solution 12.5 g, 12.5 g, Intravenous, PRN  glucagon (rDNA) injection 1 mg, 1 mg, Intramuscular, PRN  dextrose 5 % solution, 100 mL/hr, Intravenous, PRN  insulin lispro (HUMALOG) injection pen 0-6 Units, 0-6 Units, Subcutaneous, TID WC  insulin lispro (HUMALOG) injection pen 0-3 Units, 0-3 Units, Subcutaneous, Nightly  labetalol (NORMODYNE;TRANDATE) injection 10 mg, 10 mg, Intravenous, Q6H PRN  famotidine (PEPCID) injection 20 mg, 20 mg, Intravenous, BID  atorvastatin (LIPITOR) tablet 80 mg, 80 mg, Oral, Nightly  divalproex (DEPAKOTE ER) extended release tablet 250 mg, 250 mg, Oral, Nightly  finasteride (PROSCAR) tablet 5 mg, 5 mg, Oral, Daily  furosemide (LASIX) tablet 20 mg, 20 mg, Oral, Daily  lisinopril (PRINIVIL;ZESTRIL) tablet 40 mg, 40 mg, Oral, Daily  metoprolol succinate (TOPROL XL) extended release tablet 125 mg, 125 mg, Oral, Daily  sertraline (ZOLOFT) tablet 150 mg, 150 mg, Oral, Daily  tamsulosin (FLOMAX) capsule 0.4 mg, 0.4 mg, Oral, Daily  Allergies:  Fluvastatin    Social History     Tobacco Use   Smoking Status Never Smoker   Smokeless Tobacco Never Used     Social History     Substance and Sexual Activity   Alcohol Use Not Currently    Frequency: Never       Current Facility-Administered Medications:     OLANZapine (ZYPREXA) injection 10 mg, 10 mg, Intramuscular, Q8H PRN, Layne Santoyo MD, 10 mg at 04/10/19 0030    heparin (porcine) injection 5,000 Units, 5,000 Units, Subcutaneous, 3 times per day, Ronald Scott DO, 5,000 Units at 04/12/19 2382    acetaminophen (TYLENOL) tablet 650 mg, 650 mg, Oral, Q4H PRN, Kandace Jimenez MD, 650 mg at 04/11/19 1640    sodium chloride flush 0.9 % injection 10 mL, 10 mL, Intravenous, 2 times per day, Damian Levine MD, 10 mL at 04/11/19 2221    sodium chloride flush 0.9 % injection 10 mL, 10 mL, Intravenous, PRN, Damian Levine MD    magnesium hydroxide (MILK OF MAGNESIA) 400 MG/5ML suspension 30 mL, 30 mL, Oral, Daily PRN, Damian Levine MD    ondansetron [facility]) injection 4 mg, 4 mg, Intravenous, Q6H PRN, Damian Levine MD    levETIRAcetam (KEPPRA) 500 mg in sodium chloride 0.9 % 100 mL IVPB, 500 mg, Intravenous, Q12H, Damian Levine MD, Stopped at 04/12/19 0611    glucose (GLUTOSE) 40 % oral gel 15 g, 15 g, Oral, PRN, Damian Levine MD    dextrose 50 % solution 12.5 g, 12.5 g, Intravenous, PRN, Damian Levine MD    glucagon (rDNA) injection 1 mg, 1 mg, Intramuscular, PRN, Damian Levine MD    dextrose 5 % solution, 100 mL/hr, Intravenous, PRN, Damian Levine MD    insulin lispro (HUMALOG) injection pen 0-6 Units, 0-6 Units, Subcutaneous, TID WC, Damian Levine MD, 1 Units at 04/11/19 1827    insulin lispro (HUMALOG) injection pen 0-3 Units, 0-3

## 2019-04-12 NOTE — DISCHARGE INSTR - COC
Continuity of Care Form    Patient Name: Carolyn Asif   :  1950  MRN:  [id number]    Admit date:  2019  Discharge date:  19      Code Status Order: Full Code   Advance Directives:   [facility] Documentation     Date/Time Healthcare Directive Type of Healthcare Directive Copy in [facility] [address] Agent's Name Healthcare Agent's Phone Number    19 0230  No, patient does not have an advance directive for healthcare treatment -- -- -- -- --          Admitting Physician:  Rafaela Handley MD  PCP: [facility]    Discharging Nurse: [address] [place] [address] Unit/Room#: 5131/1323-43  Discharging Unit Phone Number: [phone]    Emergency Contact:   Extended Emergency Contact Information  Primary Emergency Contact: [facility]  Address: [address] [place] [address], [address] Phone: [phone]  Relation: Brother/Sister  Secondary Emergency Contact: [address] [place] [address], [address] [place] [address] Phone: [phone]  Relation: Brother/Sister    Past Surgical History:  History reviewed. No pertinent surgical history. Immunization History: There is no immunization history on file for this patient. Active Problems:  Patient Active Problem List   Diagnosis Code    Subdural hematoma ([address] S06.5X9A    Dementia with behavioral disturbance F03.91    Uncontrolled type 2 diabetes mellitus with hyperglycemia ([address] E11.65    Coronary artery disease involving native coronary artery I25.10    CKD (chronic kidney disease), stage III ([address] N18.3    Blunt trauma of face S09. 93XA    Closed fracture of nasal bones S02. 2XXA       Isolation/Infection:   Isolation          No Isolation            Nurse Assessment:  Last Vital Signs: BP (!) 127/53   Pulse 79   Temp 97.7 °F (36.5 °C) (Oral)   Resp 16   Ht 5' 8.11\" (1.73 m)   Wt 199 lb 11.8 oz (90.6 kg)   SpO2 100%   BMI 30.27 kg/m²     Last documented pain score (0-10 scale): Pain Level: 0  Last Weight:   Wt Readings from Last 1 Encounters:   04/11/19 199 lb 11.8 oz (90.6 kg)     Mental Status: Disoriented    IV Access:  - None    Nursing Mobility/ADLs:  Walking   Assisted  Transfer  Assisted  Bathing  Assisted  Dressing  Assisted  Toileting  Assisted  Feeding Independent  Med Admin  {CHP DME VBLI:[id number]}  Med Delivery   [address] OSEI MED Delivery:[id number]}    Wound Care Documentation and Therapy:        Elimination:  Continence:   · Bowel: No  · Bladder: Yes  Urinary Catheter: None   Colostomy/Ileostomy/Ileal Conduit: No       Date of Last BM: 4/12/19    Intake/Output Summary (Last 24 hours) at 4/12/2019 1111  Last data filed at 4/12/2019 1107  Gross per 24 hour   Intake 2026.85 ml   Output 2375 ml   Net -348.15 ml     I/O last 3 completed shifts: In: 2014.9 [P.O.:1440; I.V.:574.9]  Out: 2375 [Urine:2375]    Safety Concerns:     History of Falls (last 30 days) and At Risk for Falls    Impairments/Disabilities:      Vision and Hearing    Nutrition Therapy:  Current Nutrition Therapy:   - Oral Diet:  Carb Control 4 carbs/meal (1800kcals/day)    Routes of Feeding: Oral  Liquids: No Restrictions  Daily Fluid Restriction: no  Last Modified Barium Swallow with Video (Video Swallowing Test): not done    Treatments at the Time of Hospital Discharge:   Respiratory Treatments: ***  Oxygen Therapy:  is not on home oxygen therapy.   Ventilator:    - No ventilator support    Rehab Therapies: Physical Therapy and Occupational Therapy  Weight Bearing Status/Restrictions: No weight bearing restirctions  Other Medical Equipment (for information only, NOT a DME order):  walker  Other Treatments: ***    Patient's personal belongings (please select all that are sent with patient):  Hearing Aides bilateral    RN SIGNATURE:  Electronically signed by IZA PEDROZA on 4/12/19 at 1:28 PM    CASE MANAGEMENT/SOCIAL WORK SECTION    Inpatient Status Date: 4/9/19    Readmission Risk Assessment Score:  Readmission Risk              Risk of Unplanned Readmission:        28           Discharging to Facility/ Agency   · Name:  [facility]  · Address:   [address] [place] [address] 70 [address]  · Phone:    [phone]  · Fax:  [phone]    Transportation:  Mode: [address]: [address] Transport  Phone: [phone]  ETA: 8 pm    / signature: Electronically signed by Hodan Tejeda RN on 4/12/19 at 2:08 PM    PHYSICIAN SECTION    Prognosis: Good    Condition at Discharge: Stable    Rehab Potential (if transferring to Rehab): Good    Recommended Labs or Other Treatments After Discharge:   Hold ASA and plavix for 2 weeks, resume if ok with neurosurgery     Physician Certification: I certify the above information and transfer of Talat Montes  is necessary for the continuing treatment of the diagnosis listed and that he requires [place] for greater 30 days.      Update Admission H&P: No change in H&P    PHYSICIAN SIGNATURE:  Electronically signed by Ismael Boswell MD on 4/12/19 at 11:36 AM

## 2019-04-12 NOTE — PROGRESS NOTES
Patient has increased blanchable reddness on buttocks. Patient is educated on repositioning and importance of offloading buttock. Patient is assisted to reposition q2 hours and PRN. Patient is educated on frequent ambulation.

## 2019-04-12 NOTE — PROGRESS NOTES
Speech Language Pathology  Dysphagia - DC    Chart reviewed, d/w Rn who reports pt with no swallowing issues and is being dc today. Attempted to see pt however pt did not participate with therapist.  Pt declined water trials, asking SLP to take a drink first. Explained this was not allowed, pt states don't worry about it, just take a drink first. With cont explanations, pt still declined participation. As no concerns per prior sessions, no concerns per RN and lungs remain clear and pt being dc this date, will dc from dysphagia. Please re-refer should any problems with swallowing emerge. Pt on regular diet/thin liquids. Pt dc from dysphagia. Ricci Mishra M.S./CCC-SLP #[id number]  Pg.  # [phone]

## 2021-08-12 ENCOUNTER — APPOINTMENT (OUTPATIENT)
Dept: GENERAL RADIOLOGY | Age: 71
End: 2021-08-12
Payer: MEDICARE

## 2021-08-12 ENCOUNTER — HOSPITAL ENCOUNTER (EMERGENCY)
Age: 71
Discharge: HOME | End: 2021-08-12
Attending: EMERGENCY MEDICINE
Payer: MEDICARE

## 2021-08-12 VITALS
WEIGHT: 164 LBS | RESPIRATION RATE: 16 BRPM | DIASTOLIC BLOOD PRESSURE: 58 MMHG | SYSTOLIC BLOOD PRESSURE: 120 MMHG | HEART RATE: 90 BPM | TEMPERATURE: 98 F | HEIGHT: 69 IN | OXYGEN SATURATION: 98 % | BODY MASS INDEX: 24.29 KG/M2

## 2021-08-12 DIAGNOSIS — K94.13 JEJUNOSTOMY TUBE LEAK (HCC): Primary | ICD-10-CM

## 2021-08-12 PROCEDURE — 99285 EMERGENCY DEPT VISIT HI MDM: CPT

## 2021-08-12 PROCEDURE — 74018 RADEX ABDOMEN 1 VIEW: CPT

## 2021-08-12 NOTE — PROGRESS NOTES
I assumed care of this patient from Dr. Keron Rodriguez, see his note for details. Nursing home was reached during the daytime and they stated the only reason he sent the patient here was because his jejunostomy tube was leaking. As this is been fixed, he will be discharged. No new orders were placed by me and the patient will have transport arranged to his nursing facility.     Jim Albright MD no rashes , no suspicious lesions , no areas of discoloration , no jaundice present , good turgor , no masses , no tenderness on palpation

## 2021-08-12 NOTE — ED PROVIDER NOTES
[facility]  EMERGENCY DEPARTMENT ENCOUNTER          ATTENDING PHYSICIAN NOTE       Date of evaluation: 8/12/2021    Chief Complaint     G Tube Complications      History of Present Illness     Rashmi Vargas is a 70 y.o. male who presents to the emergency department for a chief complication of his jejunostomy tube. Patient is unable to provide history and multiple calls in the nursing facility were not answered so history is obtained from EMS. EMS states that the nursing facility stated he has been having leaking from his tube for the last several days. It is unclear as to what was different tonight to make them call 911. Review of Systems     Review of Systems   Unable to perform ROS: Dementia       Past Medical, Surgical, Family, and Social History     He has no past medical history on file. He has no past surgical history on file. His family history is not on file. He reports that he has never smoked. He has never used smokeless tobacco. He reports previous alcohol use.     Medications     Previous Medications    ACETAMINOPHEN (TYLENOL) 500 MG TABLET    Take 500-1,000 mg by mouth every 6 hours as needed for Pain    ARIPIPRAZOLE (ABILIFY) 15 MG TABLET    Take 1 tablet by mouth daily    ATORVASTATIN (LIPITOR) 80 MG TABLET    Take 80 mg by mouth nightly    BACITRACIN 500 UNIT/GM OPHTHALMIC OINTMENT    3 times daily Every topically to L great toe    DIVALPROEX (DEPAKOTE ER) 250 MG EXTENDED RELEASE TABLET    Take 250 mg by mouth nightly    FINASTERIDE (PROSCAR) 5 MG TABLET    Take 5 mg by mouth daily    FUROSEMIDE (LASIX) 20 MG TABLET    Take 20 mg by mouth daily    GLIPIZIDE (GLUCOTROL) 5 MG TABLET    Take 5 mg by mouth daily (with breakfast)    LEVETIRACETAM (KEPPRA) 500 MG TABLET    Take 1 tablet by mouth 2 times daily for 14 days    LISINOPRIL (PRINIVIL;ZESTRIL) 20 MG TABLET    Take 1 tablet by mouth daily    METOPROLOL SUCCINATE (TOPROL XL) 50 MG EXTENDED RELEASE TABLET    Take 125 mg

## 2021-08-12 NOTE — ED NOTES
Spoke to nurse from [facility] to verify why patient was sent to ED. RN states his J tube was leaking bile, no other concerns.    Report given to [facility], patient awaiting transport back to facility at [address] RN  08/12/21 9679

## 2023-10-06 NOTE — PROGRESS NOTES
Date of Service: 10/05/2023    HISTORY:    The patient is a very pleasant 69-year-old male with active medical conditions including hyperlipidemia, history of cardiac arrhythmias (followed by cardiology on dose beta blocking medication), nephrolithiasis (under the care and direction of Dr. Wilkinson, nephrology), history of hepatitis B (followed by gastroenterology), history of chest pain (followed by cardiology in the past), bipolar disorder (followed by Dr. Brachmann, medically managed), and headaches (followed by Dr. Grant, neurology), who presents for followup of chronic active medical conditions and does present with his wife.  He states generally has been doing well.  He states he has no new symptoms or concerns at this time.  He is interested in a flu shot.  He states his low back pain seems to wax and wane.  There has been no change in this.  His dizziness, which he describes more as vertigo than headaches, seem to come and go.  He has had a good week this week.    He is following up with gastroenterology in the near future.  In reviewing the chart, he is due for colonoscopy sometime in late 2023/early 2024.    All other review of systems otherwise reviewed and negative.      PAST HISTORY:   Reviewed, per Epic.    PHYSICAL EXAMINATION:    VITAL SIGNS:  Per the nurse, blood pressure 130/68, pulse 52 and regular, weight 172 pounds, 5 feet 8 inches tall.  Temperature 97.5, pulse oximeter is 96%.  HEENT:  Head normocephalic.  Eyes:  Pupils equal.  Sclerae and conjunctivae are clear.  External ears appear unremarkable.  He is wearing a mask.  NECK:  Supple.  LUNGS:  Clear to auscultation.  HEART:  Reveals a regular rate and rhythm, normal S1, S2, no murmurs.  EXTREMITIES:  No edema is noted.  NEUROLOGIC:  The patient is cognitively intact per observation.  VASCULAR:  Carotid upstrokes are brisk bilaterally.  I do not appreciate any obvious carotid bruits.    LABORATORY EVALUATION:    Reviewed in detail with  Physical Therapy  Facility/Department: [facility] ICU  Daily Treatment Note  NAME: Ines Cam  : 1950  MRN: [id number]    Date of Service: 2019    Discharge Recommendations:    Ines Cam scored a 16/24 on the AM-PAC short mobility form. Current research shows that an AM-PAC score of 17 or less is typically not associated with a discharge to the patient's home setting. Based on the patients AM-PAC score and their current functional mobility deficits, it is recommended that the patient have 3-5 sessions per week of Physical Therapy at d/c to increase the patients independence. PT Equipment Recommendations  Equipment Needed: No    Patient Diagnosis(es): There were no encounter diagnoses. has no past medical history on file. has no past surgical history on file. Restrictions  Position Activity Restriction  Other position/activity restrictions: up with assist  Subjective   General  Chart Reviewed: Yes  Additional Pertinent Hx: 71 y.o. male w/ PMH Dementia who presented on 2019 to [facility] ED s/p fall. Found to have SDH. CT Head: Possible mildly decreased left subdural hematoma   Family / Caregiver Present: Yes(brother)  Referring Practitioner:  Sebastian Estevez MD  Subjective  Subjective: Pt found sitting up in chair upon arrival, reporting unrated R wrist pain and neck pain, agreeable to therapy. Orientation  Orientation  Overall Orientation Status: Impaired  Orientation Level: Oriented to person;Oriented to place; Disoriented to time;Disoriented to situation(oriented to [employer]")  Cognition      Objective      Transfers  Sit to Stand: Contact guard assistance(from chair x3 trials, from toilet)  Stand to sit: Minimal Assistance;Contact guard assistance(to toilet, to chair x3 trials)  Comment: PT requiring several VC for hand placement with transfers and proper use of RW including hand placement and staying inside walker.    Ambulation  Ambulation?: Yes  Ambulation [facility].  A comprehensive metabolic panel reveals normal electrolytes, glucose is 92, creatinine is 1.37 (intermittently elevated in the past).  GFR 56.  Liver function studies are normal.  Cholesterol 153, HDL 45, LDL 90, triglycerides 89.  CPK is normal.  CBC is normal other than a white count 3900 with normal differential.    ASSESSMENT AND PLAN:    1.  Hyperlipidemia.  Continues to improve.  Continue on current treatment.  He will see us in 6 months' time.  2.  History of vertigo/dizziness/headaches.  Does see Dr. Grant.  Again, this has been stable and this week has actually been quite good.  3.  Chronic renal insufficiency.  He is currently stage III.  This does fluctuate somewhat.  We will continue to monitor this with him.  He will see us in 6 months' time.  4.  Bipolar disorder.  He is on medication.  This has been followed by Dr. Brachmann, who he follows up regularly with.  He has an appointment later this month.  5.  History of premature ventricular contractions. Does see Dr. Cisneros (electrophysiology).  Echocardiogram in December 2022 with normal ejection fraction of 54%.    6.  Health issues.  He is going to have his flu shot.  We will update Medicare wellness visit at his followup appointment.  It appears he is due for colonoscopy at the end of the year.  He does see Dr. Black (gastroenterology).  We will have Chana send a message to Dr. Black's staff.  It appears he is following up with gastroenterology later this month as well.  He understands to discuss this with him.  I did talk to him about COVID booster recommendation and vaccination for RSV.  He will give this some thought.      He will see us again in 6 months' time.  It is always a pleasure to see [facility].        Dictated By: Jovon Purdy MD  Signing Provider: MD VIJAY Zacarias/hubert ([id number])   DD: 10/05/2023 9:23:50 AM TD: 10/06/2023 6:40:51 AM